# Patient Record
Sex: FEMALE | Race: WHITE | NOT HISPANIC OR LATINO | URBAN - METROPOLITAN AREA
[De-identification: names, ages, dates, MRNs, and addresses within clinical notes are randomized per-mention and may not be internally consistent; named-entity substitution may affect disease eponyms.]

---

## 2020-01-04 ENCOUNTER — EMERGENCY (EMERGENCY)
Facility: HOSPITAL | Age: 55
LOS: 0 days | Discharge: HOME | End: 2020-01-04
Attending: EMERGENCY MEDICINE
Payer: COMMERCIAL

## 2020-01-04 VITALS — WEIGHT: 119.93 LBS | HEIGHT: 62 IN

## 2020-01-04 VITALS
TEMPERATURE: 98 F | OXYGEN SATURATION: 100 % | SYSTOLIC BLOOD PRESSURE: 126 MMHG | HEART RATE: 93 BPM | RESPIRATION RATE: 25 BRPM | DIASTOLIC BLOOD PRESSURE: 91 MMHG

## 2020-01-04 DIAGNOSIS — M06.9 RHEUMATOID ARTHRITIS, UNSPECIFIED: ICD-10-CM

## 2020-01-04 DIAGNOSIS — E78.5 HYPERLIPIDEMIA, UNSPECIFIED: ICD-10-CM

## 2020-01-04 DIAGNOSIS — R07.9 CHEST PAIN, UNSPECIFIED: ICD-10-CM

## 2020-01-04 DIAGNOSIS — F41.9 ANXIETY DISORDER, UNSPECIFIED: ICD-10-CM

## 2020-01-04 DIAGNOSIS — F32.9 MAJOR DEPRESSIVE DISORDER, SINGLE EPISODE, UNSPECIFIED: ICD-10-CM

## 2020-01-04 LAB
ALBUMIN SERPL ELPH-MCNC: 4.2 G/DL — SIGNIFICANT CHANGE UP (ref 3.3–5)
ALP SERPL-CCNC: 95 U/L — SIGNIFICANT CHANGE UP (ref 40–120)
ALT FLD-CCNC: 44 U/L — SIGNIFICANT CHANGE UP (ref 12–78)
ANION GAP SERPL CALC-SCNC: 6 MMOL/L — SIGNIFICANT CHANGE UP (ref 5–17)
APAP SERPL-MCNC: <2 UG/ML — LOW (ref 10–30)
APPEARANCE UR: CLEAR — SIGNIFICANT CHANGE UP
AST SERPL-CCNC: 26 U/L — SIGNIFICANT CHANGE UP (ref 15–37)
BASOPHILS # BLD AUTO: 0.04 K/UL — SIGNIFICANT CHANGE UP (ref 0–0.2)
BASOPHILS NFR BLD AUTO: 0.6 % — SIGNIFICANT CHANGE UP (ref 0–2)
BILIRUB SERPL-MCNC: 0.4 MG/DL — SIGNIFICANT CHANGE UP (ref 0.2–1.2)
BILIRUB UR-MCNC: NEGATIVE — SIGNIFICANT CHANGE UP
BUN SERPL-MCNC: 15 MG/DL — SIGNIFICANT CHANGE UP (ref 7–23)
CALCIUM SERPL-MCNC: 9.1 MG/DL — SIGNIFICANT CHANGE UP (ref 8.5–10.1)
CHLORIDE SERPL-SCNC: 105 MMOL/L — SIGNIFICANT CHANGE UP (ref 96–108)
CO2 SERPL-SCNC: 28 MMOL/L — SIGNIFICANT CHANGE UP (ref 22–31)
COLOR SPEC: YELLOW — SIGNIFICANT CHANGE UP
CREAT SERPL-MCNC: 0.77 MG/DL — SIGNIFICANT CHANGE UP (ref 0.5–1.3)
DIFF PNL FLD: NEGATIVE — SIGNIFICANT CHANGE UP
EOSINOPHIL # BLD AUTO: 0.02 K/UL — SIGNIFICANT CHANGE UP (ref 0–0.5)
EOSINOPHIL NFR BLD AUTO: 0.3 % — SIGNIFICANT CHANGE UP (ref 0–6)
ETHANOL SERPL-MCNC: <10 MG/DL — SIGNIFICANT CHANGE UP (ref 0–10)
GLUCOSE SERPL-MCNC: 93 MG/DL — SIGNIFICANT CHANGE UP (ref 70–99)
GLUCOSE UR QL: NEGATIVE MG/DL — SIGNIFICANT CHANGE UP
HCT VFR BLD CALC: 41.4 % — SIGNIFICANT CHANGE UP (ref 34.5–45)
HGB BLD-MCNC: 13.9 G/DL — SIGNIFICANT CHANGE UP (ref 11.5–15.5)
IMM GRANULOCYTES NFR BLD AUTO: 0.3 % — SIGNIFICANT CHANGE UP (ref 0–1.5)
KETONES UR-MCNC: ABNORMAL
LEUKOCYTE ESTERASE UR-ACNC: NEGATIVE — SIGNIFICANT CHANGE UP
LYMPHOCYTES # BLD AUTO: 1.32 K/UL — SIGNIFICANT CHANGE UP (ref 1–3.3)
LYMPHOCYTES # BLD AUTO: 21 % — SIGNIFICANT CHANGE UP (ref 13–44)
MCHC RBC-ENTMCNC: 31.7 PG — SIGNIFICANT CHANGE UP (ref 27–34)
MCHC RBC-ENTMCNC: 33.6 GM/DL — SIGNIFICANT CHANGE UP (ref 32–36)
MCV RBC AUTO: 94.3 FL — SIGNIFICANT CHANGE UP (ref 80–100)
MONOCYTES # BLD AUTO: 0.52 K/UL — SIGNIFICANT CHANGE UP (ref 0–0.9)
MONOCYTES NFR BLD AUTO: 8.3 % — SIGNIFICANT CHANGE UP (ref 2–14)
NEUTROPHILS # BLD AUTO: 4.37 K/UL — SIGNIFICANT CHANGE UP (ref 1.8–7.4)
NEUTROPHILS NFR BLD AUTO: 69.5 % — SIGNIFICANT CHANGE UP (ref 43–77)
NITRITE UR-MCNC: NEGATIVE — SIGNIFICANT CHANGE UP
PCP SPEC-MCNC: SIGNIFICANT CHANGE UP
PH UR: 7 — SIGNIFICANT CHANGE UP (ref 5–8)
PLATELET # BLD AUTO: 343 K/UL — SIGNIFICANT CHANGE UP (ref 150–400)
POTASSIUM SERPL-MCNC: 3.7 MMOL/L — SIGNIFICANT CHANGE UP (ref 3.5–5.3)
POTASSIUM SERPL-SCNC: 3.7 MMOL/L — SIGNIFICANT CHANGE UP (ref 3.5–5.3)
PROT SERPL-MCNC: 7.8 GM/DL — SIGNIFICANT CHANGE UP (ref 6–8.3)
PROT UR-MCNC: NEGATIVE MG/DL — SIGNIFICANT CHANGE UP
RBC # BLD: 4.39 M/UL — SIGNIFICANT CHANGE UP (ref 3.8–5.2)
RBC # FLD: 11.9 % — SIGNIFICANT CHANGE UP (ref 10.3–14.5)
SALICYLATES SERPL-MCNC: <1.7 MG/DL — LOW (ref 2.8–20)
SODIUM SERPL-SCNC: 139 MMOL/L — SIGNIFICANT CHANGE UP (ref 135–145)
SP GR SPEC: 1.01 — SIGNIFICANT CHANGE UP (ref 1.01–1.02)
TROPONIN I SERPL-MCNC: <0.015 NG/ML — SIGNIFICANT CHANGE UP (ref 0.01–0.04)
TROPONIN I SERPL-MCNC: <0.015 NG/ML — SIGNIFICANT CHANGE UP (ref 0.01–0.04)
TSH SERPL-MCNC: 3.18 UU/ML — SIGNIFICANT CHANGE UP (ref 0.34–4.82)
UROBILINOGEN FLD QL: NEGATIVE MG/DL — SIGNIFICANT CHANGE UP
WBC # BLD: 6.29 K/UL — SIGNIFICANT CHANGE UP (ref 3.8–10.5)
WBC # FLD AUTO: 6.29 K/UL — SIGNIFICANT CHANGE UP (ref 3.8–10.5)

## 2020-01-04 PROCEDURE — 84443 ASSAY THYROID STIM HORMONE: CPT

## 2020-01-04 PROCEDURE — 81003 URINALYSIS AUTO W/O SCOPE: CPT

## 2020-01-04 PROCEDURE — 99284 EMERGENCY DEPT VISIT MOD MDM: CPT

## 2020-01-04 PROCEDURE — 84484 ASSAY OF TROPONIN QUANT: CPT

## 2020-01-04 PROCEDURE — 85025 COMPLETE CBC W/AUTO DIFF WBC: CPT

## 2020-01-04 PROCEDURE — 93010 ELECTROCARDIOGRAM REPORT: CPT

## 2020-01-04 PROCEDURE — 93005 ELECTROCARDIOGRAM TRACING: CPT

## 2020-01-04 PROCEDURE — 80307 DRUG TEST PRSMV CHEM ANLYZR: CPT

## 2020-01-04 PROCEDURE — 87086 URINE CULTURE/COLONY COUNT: CPT

## 2020-01-04 PROCEDURE — 99284 EMERGENCY DEPT VISIT MOD MDM: CPT | Mod: 25

## 2020-01-04 PROCEDURE — 80053 COMPREHEN METABOLIC PANEL: CPT

## 2020-01-04 PROCEDURE — 71045 X-RAY EXAM CHEST 1 VIEW: CPT

## 2020-01-04 PROCEDURE — 36415 COLL VENOUS BLD VENIPUNCTURE: CPT

## 2020-01-04 PROCEDURE — 71045 X-RAY EXAM CHEST 1 VIEW: CPT | Mod: 26

## 2020-01-04 PROCEDURE — 90792 PSYCH DIAG EVAL W/MED SRVCS: CPT | Mod: GT,GC

## 2020-01-04 RX ADMIN — Medication 1 MILLIGRAM(S): at 18:55

## 2020-01-04 NOTE — ED PROVIDER NOTE - PHYSICAL EXAMINATION
Constitutional: No fever or chills  Eyes: No visual changes  HEENT: No throat pain  CV: No chest pain  Resp: No SOB no cough  GI: No abd pain, nausea or vomiting  : No dysuria  MSK: No musculoskeletal pain  Skin: No rash  Neuro: No headache   Psych: +tearful, +anxious +stressed

## 2020-01-04 NOTE — ED BEHAVIORAL HEALTH NOTE - BEHAVIORAL HEALTH NOTE
Consult requested by EM Attending Dr. Coley at 20:06 Telepsychiatry attempted to consult at 20:20 but  unable to initiate due to no 1:1 being available. ED staff educated to notify Telepsychiatry  once 1:1 available.

## 2020-01-04 NOTE — ED PROVIDER NOTE - OBJECTIVE STATEMENT
53 y/o female with PMHx of anxiety, hx of suicide attempt at 19 y/o presents to the ED for psych evaluation. Pt states she is having a hard time with her son who has autism and is  "combative" with her. Pt reports she got her son a medical marijuana card today after he has not been taking his medications and when she told him to use it per the doctor's request, her son grew very angry with her. Patient attempted to jump out of the car. They both went home, and her son started to punch holes in the wall and she yelled at him. and told him "she hates him" and wants him to leave. Patient denies physical harm from son.. Denies auditory/visual hallucinations, SI, HI. Pt has been emotionally stressed, is shaking, very tearful, and questions why she is even here. Also notes that she is having CP. Pt has been very stressed trying to take care of her son and her elderly parent. On Lexapro 30mg after suicide attempt at 17y/o. Non smoker, social EtOH use. 53 y/o female with PMHx of anxiety, hx of suicide attempt at 19 y/o presents to the ED for psych evaluation. Pt states she is having a hard time with her son who has autism and is  "combative" with her. Pt reports she got her son a medical marijuana card today after he has not been taking his medications and when she told him to use it per the doctor's request, her son grew very angry with her. Patient attempted to jump out of the car. They both went home, and her son started to punch holes in the wall and she yelled at him. and told him "she hates him" and wants him to leave. Patient denies physical harm from son.. Denies auditory/visual hallucinations, SI, HI. Pt has been emotionally stressed, is shaking, very tearful, and questions why she is even here. Also notes that she is having CP. Pt has been very stressed trying to take care of her son and her elderly parent. Non smoker, social EtOH use.

## 2020-01-04 NOTE — ED PROVIDER NOTE - PATIENT PORTAL LINK FT
You can access the FollowMyHealth Patient Portal offered by [facility] by registering at the following website: [email or web address]. By joining [facility]’s FollowMyHealth portal, you will also be able to view your health information using other applications (apps) compatible with our system.

## 2020-01-04 NOTE — ED PROVIDER NOTE - NS_ ATTENDINGSCRIBEDETAILS _ED_A_ED_FT
I, Gian Cano MD,  performed the initial face to face bedside interview with this patient regarding history of present illness, review of symptoms and relevant past medical, social and family history.  I completed an independent physical examination.  I was the initial provider who evaluated this patient.  The history, relevant review of systems, past medical and surgical history, medical decision making, and physical examination was documented by the scribe in my presence and I attest to the accuracy of the documentation.

## 2020-01-04 NOTE — ED BEHAVIORAL HEALTH ASSESSMENT NOTE - SUMMARY
54 year-old woman with depression and anxiety, one psychiatric hospitalization at age 18 when she attempted to OD on pills, no other suicide attempts, no SIB, denying substance abuse, domiciled, employed, in treatment at [facility], who walked in to the ED today with a complaint having a nervous breakdown. The patient's presentation tonight is explained by poor coping skills and a low frustration tolerance in dealing with the psychosocial stressors of living at home with a high-functioning autistic son. The patient denies SI/HI and, per collateral, she is at her psychiatric baseline. The patient is able to contract for safety and she is agreeable to following up with her outpatient therapist and psychiatrist at [facility]. 54 year-old woman with depression and anxiety, one psychiatric hospitalization at age 18 when she attempted to OD on pills, no other suicide attempts, no SIB, denying substance abuse, domiciled, employed, in treatment at [facility], who walked in to the ED today with a complaint having a nervous breakdown. On interview, the patient reports that her "nervous breakdown" has resolved. The patient's presentation tonight is explained by poor coping skills and a low frustration tolerance in dealing with the psychosocial stressors of living at home with a high-functioning autistic son who can be belligerent at times. The patient denies SI/HI and, per collateral, she is at her psychiatric baseline. The patient is able to contract for safety and she is agreeable to follow up with her outpatient therapist and psychiatrist at [facility]. She does not want inpatient treatment; nor is inpatient treatment indicated given her presentation tonight.

## 2020-01-04 NOTE — ED BEHAVIORAL HEALTH ASSESSMENT NOTE - DETAILS
History of overdosing at age 18 Son with autism, daughter with anxiety patient walked in to the ED by herself

## 2020-01-04 NOTE — ED PROVIDER NOTE - CLINICAL SUMMARY MEDICAL DECISION MAKING FREE TEXT BOX
54 F presents to the ED with hx of anxiety with significant life stressors at home caused her to "break down" Started to have shaking, CP and thoughts of why she is even here. Concern for psychiatric well-being. Will obtain psych labs, pysch consult and reasess. 54 F presents to the ED with hx of anxiety with significant life stressors at home caused her to "break down" Started to have shaking, CP and thoughts of why she is even here. Concern for psychiatric well-being. Will obtain psych labs, psychiatric consult and reassess.

## 2020-01-04 NOTE — ED PROVIDER NOTE - NS ED ROS FT
Constitutional: No fever or chills  Eyes: No visual changes  HEENT: No throat pain  CV: No chest pain  Resp: No SOB no cough  GI: No abd pain, nausea or vomiting  : No dysuria  MSK: No musculoskeletal pain  Skin: No rash  Neuro: No headache   Psych: +anxious, +stressed Constitutional: No fever or chills  Eyes: No visual changes  HEENT: No throat pain  CV: No chest pain  Resp: No SOB no cough  GI: No abd pain, nausea or vomiting  : No dysuria  MSK: No musculoskeletal pain  Skin: No rash  Neuro: No headache   Psych: +anxious, +stressed +shaky

## 2020-01-04 NOTE — ED PROVIDER NOTE - NSFOLLOWUPINSTRUCTIONS_ED_ALL_ED_FT
1. return for worsening symptoms or anything concerning to you  2. take all home meds as prescribed  3. follow up with your pmd call to make an appointment   4. follow up at [facility]    Anxiety    WHAT YOU NEED TO KNOW:    Anxiety is a condition that causes you to feel extremely worried or nervous. The feelings are so strong that they can cause problems with your daily activities or sleep. Anxiety may be triggered by something you fear, or it may happen without a cause. Family or work stress, smoking, caffeine, and alcohol can increase your risk for anxiety. Certain medicines or health conditions can also increase your risk. Anxiety can become a long-term condition if it is not managed or treated.     DISCHARGE INSTRUCTIONS:    Call 911 if:     You have chest pain, tightness, or heaviness that may spread to your shoulders, arms, jaw, neck, or back.      You feel like hurting yourself or someone else.     Contact your healthcare provider if:     Your symptoms get worse or do not get better with treatment.      Your anxiety keeps you from doing your regular daily activities.      You have new symptoms since your last visit.      You have questions or concerns about your condition or care.    Medicines:     Medicines may be given to help you feel more calm and relaxed, and decrease your symptoms.      Take your medicine as directed. Contact your healthcare provider if you think your medicine is not helping or if you have side effects. Tell him of her if you are allergic to any medicine. Keep a list of the medicines, vitamins, and herbs you take. Include the amounts, and when and why you take them. Bring the list or the pill bottles to follow-up visits. Carry your medicine list with you in case of an emergency.    Follow up with your healthcare provider within 2 weeks or as directed: Write down your questions so you remember to ask them during your visits.    Manage anxiety:     Talk to someone about your anxiety. Your healthcare provider may suggest counseling. Cognitive behavioral therapy can help you understand and change how you react to events that trigger your symptoms. You might feel more comfortable talking with a friend or family member about your anxiety. Choose someone you know will be supportive and encouraging.      Find ways to relax. Activities such as exercise, meditation, or listening to music can help you relax. Spend time with friends, or do things you enjoy.      Practice deep breathing. Deep breathing can help you relax when you feel anxious. Focus on taking slow, deep breaths several times a day, or during an anxiety attack. Breathe in through your nose and out through your mouth.       Create a regular sleep routine. Regular sleep can help you feel calmer during the day. Go to sleep and wake up at the same times every day. Do not watch television or use the computer right before bed. Your room should be comfortable, dark, and quiet.       Eat a variety of healthy foods. Healthy foods include fruits, vegetables, low-fat dairy products, lean meats, fish, whole-grain breads, and cooked beans. Healthy foods can help you feel less anxious and have more energy.      Exercise regularly. Exercise can increase your energy level. Exercise may also lift your mood and help you sleep better. Your healthcare provider can help you create an exercise plan.      Do not smoke. Nicotine and other chemicals in cigarettes and cigars can increase anxiety. Ask your healthcare provider for information if you currently smoke and need help to quit. E-cigarettes or smokeless tobacco still contain nicotine. Talk to your healthcare provider before you use these products.       Do not have caffeine. Caffeine can make your symptoms worse. Do not have foods or drinks that are meant to increase your energy level.      Limit or do not drink alcohol. Ask your healthcare provider if alcohol is safe for you. You may not be able to drink alcohol if you take certain anxiety or depression medicines. Limit alcohol to 1 drink per day if you are a woman. Limit alcohol to 2 drinks per day if you are a man. A drink of alcohol is 12 ounces of beer, 5 ounces of wine, or 1½ ounces of liquor.       Do not use drugs. Drugs can make your anxiety worse. It can also make anxiety hard to manage. Talk to your healthcare provider if you use drugs and want help to quit.

## 2020-01-04 NOTE — ED BEHAVIORAL HEALTH ASSESSMENT NOTE - RISK ASSESSMENT
Chronic risk is elevated because of a history of a suicide attempt and FHx of a suicide attempt (son). The patient's protective factors include no SI/HI presently or recently, being future-oriented, being employed, being domiciled, denying substance abuse, being engaged in treatment, reporting medication compliance. The patient is safe for outpatient follow up. Low Acute Suicide Risk Chronic risk is elevated because of a history of a suicide attempt and FHx of a suicide attempt (son). These risks were addressed tonight through supportive counseling and safety planning. The patient's protective factors include no SI/HI presently or recently, being future-oriented, being employed, being domiciled, denying substance abuse, being engaged in treatment, reporting medication compliance. The patient is safe for outpatient follow up.

## 2020-01-04 NOTE — ED PROVIDER NOTE - PROGRESS NOTE DETAILS
spoke with telepsych will see patient shortly. Gian Cano M.D., Attending Physician trop negative x 2 - cleared by telepsych to follow up with [facility]. will d/c with follow up and strict return precautions. Gian Cano M.D., Attending Physician

## 2020-01-04 NOTE — ED BEHAVIORAL HEALTH ASSESSMENT NOTE - DESCRIPTION
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 Per nurse Fabi, patient arrived to the ED at 16:53 dressed appropriately for weather, with good hygiene/grooming and unaccompanied by family or social supports. Nurse notes that upon arrival patient stated that today she had an argument with her son (who has Asperger’s and is combative) states that she has been emotionally stressed and had a panic attack. Nurse notes that upon arrival patient was anxious, shaky and given Ativan 1mg ~17:51 with good effect. Patient denies SI/HI and has been calm since receiving the Ativan. Patient complied with triage protocols – provided blood/urine willingly, changed into a hospital gown and allowed security to wand/collect her belongings without incident. Nothing of note in patients’ belongings. Nurse notes that patients speech is at a normal rate, audible, good articulation, good eye contact and a linear thought content. Patient denies AVH, delusions, is AXO3 and presents with good insight and judgement. Patient has not eaten as of yet and is resting calmly on the stretcher. No behavioral issues or aggression reported. Patient placed on an arms-length 1:1, in a hospital gown and in a private room for telepsych assessment. No additional complaints at this time.     ICU Vital Signs Last 24 Hrs  T(C): 36.8 (04 Jan 2020 17:31), Max: 36.8 (04 Jan 2020 17:31)  T(F): 98.3 (04 Jan 2020 17:31), Max: 98.3 (04 Jan 2020 17:31)  HR: 93 (04 Jan 2020 17:31) (93 - 93)  BP: 126/91 (04 Jan 2020 17:31) (126/91 - 126/91)  BP(mean): --  ABP: --  ABP(mean): --  RR: 25 (04 Jan 2020 17:31) (25 - 25)  SpO2: 100% (04 Jan 2020 17:31) (100% - 100%)
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
## 2020-01-04 NOTE — ED BEHAVIORAL HEALTH ASSESSMENT NOTE - REFERRAL / APPOINTMENT DETAILS
Follow up at [facility] weekly group next week and with  "ISSA" at next medication management appointment

## 2020-01-04 NOTE — ED ADULT NURSE NOTE - OBJECTIVE STATEMENT
Pt ambulatory to triage, A&O x3, requesting psych evaluation.  Pt Pt ambulatory to triage, A&O x3, requesting psych evaluation.  Pt reports having argument with son who has Asperger's today, states "he got mad at me in the car and jumped out, I don't know where he is and I'm afraid he's going to kill himself and it's my fault."  Pt denies SI/HI.  Pt c/o anxiety, chest pain, "I can't stop shaking." Hx of arthritis.  Pt is calm and appropriate.

## 2020-01-04 NOTE — ED STATDOCS - PROGRESS NOTE DETAILS
y/o female with PMHx of anxiety presents to the ED for psych evaluation. Pt reports she got her son a medical marijuana card today and when she told him to use it per the doctor's request, her son grew very angry with her, she told him she hated him, and pt tried to get out of the car. Denies physical harm from son. "Now my son is going to try to kill himself." "I can't stop shaking." Denies auditory/visual hallucinations, SI, HI. When asked if she has SI, pt states "I'm too afraid to kill myself." Pt states she is having a hard time with her son who has autism and is "mean" and "combative" with her. Pt has been emotionally stressed, is shaking and very tearful. Pt has been very stressed trying to take care of her son and her elderly parent. Pt reports not wanting any family to visit her at this time. On Lexapro 30mg after suicide attempt at 19y/o. Will send pt to main ED for further evaluation. Jasmin POP for ED attending, Dr. Agarwal: 55 y/o female with PMHx of anxiety, hx of suicide attempt at 17 y/o presents to the ED for psych evaluation. Pt reports she got her son a medical marijuana card today and when she told him to use it per the doctor's request, her son grew very angry with her, she told him she hated him, and pt tried to get out of the car. Denies physical harm from son. "Now my son is going to try to kill himself." "I can't stop shaking." Denies auditory/visual hallucinations, SI, HI. When asked if she has SI, pt states "I'm too afraid to kill myself." Pt states she is having a hard time with her son who has autism and is "mean" and "combative" with her. Pt has been emotionally stressed, is shaking and very tearful. Pt has been very stressed trying to take care of her son and her elderly parent. Pt reports not wanting any family to visit her at this time. On Lexapro 30mg after suicide attempt at 17y/o. Will send pt to main ED for further evaluation. Jasmin POP for ED attending, Dr. Agarwal: 55 y/o female with PMHx of anxiety, hx of suicide attempt at 17 y/o presents to the ED for psych evaluation. Pt states she is having a hard time with her son who has autism and is "mean" and "combative" with her. Pt reports she got her son a medical marijuana card today and when she told him to use it per the doctor's request, her son grew very angry with her, she told him she hated him, and pt's son tried to get out of the car. Denies physical harm from son. "Now my son is going to try to kill himself." "I can't stop shaking." Denies auditory/visual hallucinations, SI, HI. When asked if she has SI, pt states "I'm too afraid to kill myself." Pt has been emotionally stressed, is shaking and very tearful. Pt has been very stressed trying to take care of her son and her elderly parent. Pt reports not wanting any family to visit her at this time. On Lexapro 30mg after suicide attempt at 19y/o. Will send pt to main ED for further evaluation.

## 2020-01-04 NOTE — ED BEHAVIORAL HEALTH ASSESSMENT NOTE - SUICIDE PROTECTIVE FACTORS
Positive therapeutic relationships/Engaged in work or school/Identifies reasons for living/Has future plans/Supportive social network of family or friends/Responsibility to family and others

## 2020-01-04 NOTE — ED BEHAVIORAL HEALTH ASSESSMENT NOTE - CASE SUMMARY
In brief, the patient is a 54-year-old woman with a reported history of anxiety and depression, 1 prior remote suicide attempt prompting her lone inpatient psychiatric hospitalization, currently in outpatient mental health treatment through the [facility], who presented with anxiety in the context of an argument with her autistic son. The patient reports that her anxiety has decreased during her time spent in the hospital and persistently denied any acute suicidality or homicidality throughout the course of her stay. The patient is not an imminent threat to self or others at present and does not require acute inpatient psychiatric hospitalization at this point in time. She remains appropriate for an outpatient level of mental health care and will follow-up with her outpatient mental health provider.

## 2020-01-04 NOTE — ED BEHAVIORAL HEALTH ASSESSMENT NOTE - HPI (INCLUDE ILLNESS QUALITY, SEVERITY, DURATION, TIMING, CONTEXT, MODIFYING FACTORS, ASSOCIATED SIGNS AND SYMPTOMS)
54 year-old woman with depression and anxiety, one psychiatric hospitalization at age 18 when she attempted to OD on pills, no other suicide attempts, no SIB, denying substance abuse, domiciled, employed, in treatment at [facility], who walked in to the ED today with a complaint having a nervous breakdown. In the ED, the patient was noted to be anxious and she was given 1 mg of Ativan at 1800. On evaluation, the patient reports that her "nervous breakdown" has resolved but she continues to have regrets about an argument at home today with her son who has high-functioning austism. She says the argument was about a medical marijuana card that the son's doctor gave him and the patient told the son to use the marijuana as prescribed, which caused the son to become irate and punch walls. The patient then told her son she hated him and needed him to leave the house. She states that she worries about her son because he attempted to hang himself last year. She denies that she thinks her son is going to harm himself imminently. The patient herself denies SI/HI and does not want to be admitted to the hospital. The patient denies symptoms of psychosis or silvestre, past or present. 54 year-old woman with depression and anxiety, one psychiatric hospitalization at age 18 when she attempted to OD on pills, no other suicide attempts, no SIB, denying substance abuse, domiciled, employed, in treatment at [facility], who walked in to the ED today with a complaint having a nervous breakdown. In the ED, the patient was noted to be anxious and she was given 1 mg of Ativan at 1800. On evaluation, the patient reports that her "nervous breakdown" has resolved but she continues to have regrets about an argument at home today with her son who has high-functioning austism. She says the argument was about a medical marijuana card that the son's doctor gave him and the patient told the son to use the marijuana as prescribed, which caused the son to become irate and punch walls. The patient then told her son she hated him and needed him to leave the house. She states that she worries about her son because he attempted to hang himself last year. She denies that she thinks her son is going to harm himself imminently. The patient herself denies SI/HI and does not want to be admitted to the hospital. The patient denies symptoms of psychosis or silvestre, past or present.    The patient's  reports that the patient is at her baseline. He says that she and the son are frequently having disagreements that sometimes grow heated with the son lashing out physically. The  has no acute safety concerns for the patient or for his son. 54 year-old woman with depression and anxiety, one psychiatric hospitalization at age 18 when she attempted to OD on pills, no other suicide attempts, no SIB, denying substance abuse, domiciled, employed, in treatment at [facility], who walked in to the ED today with a complaint having a nervous breakdown. In the ED, the patient was noted to be anxious and she was given 1 mg of Ativan at 1800. On evaluation, the patient reports that her "nervous breakdown" has resolved but she continues to have regrets about an argument at home today with her son who has high-functioning austism. She says the argument was about a medical marijuana card that the son's doctor gave him and the patient told the son to use the marijuana as prescribed, which caused the son to become irate and punch walls. The patient then told her son she hated him and needed him to leave the house. She states that she worries about her son because he attempted to hang himself last year. She denies that she thinks her son is going to harm himself imminently. The patient herself denies SI/HI and does not want to be admitted to the hospital. The patient denies symptoms of psychosis or silvestre, past or present.    Collateral provided by , Solitario Stokesadriana [phone] daily contact and reliability is high but limited. Patient's baseline symptoms include a fluctuation of high-low moods, mild temperament and mild irritability. Baseline treatment includes monthly psychopharm, is prescribed Lexapro (psychiatrist name and medication dosage unknown), weekly psychotherapy (therapist name unknown) and weekly group therapy.  wonders whether patient may have been inconsistently taking her medications around the holidays. He states that at baseline patient is mildly irritable, but typically has patience for her son. He reports that patient has been under a significant amount of stress with her son – he has been more defiant, agitated and seemingly unappreciative. He states that their son is scheduled to return back to school at the end of January, which is a stress provoking situation for patient, as last semester son had difficulties adjusting to college and had to return home. He states that today, patient and her son had an argument about his medication compliance, which quickly escalated to an explosive episode – patient made hurtful comments towards son and in return he punched holes in the wall.  notes that son has never physically assaulted him or patient. No other mood/psychotic/depressive/anxiety symptoms, aggression or thoughts of harming self or others reported.

## 2020-01-04 NOTE — ED ADULT NURSE NOTE - NSIMPLEMENTINTERV_GEN_ALL_ED
Implemented All Universal Safety Interventions:  [place]rif[place] to call system. Call bell, personal items and telephone within reach. Instruct patient to call for assistance. Room bathroom lighting operational. Non-slip footwear when patient is off stretcher. Physically safe environment: no spills, clutter or unnecessary equipment. Stretcher in lowest position, wheels locked, appropriate side rails in place.

## 2020-01-05 LAB
CULTURE RESULTS: SIGNIFICANT CHANGE UP
SPECIMEN SOURCE: SIGNIFICANT CHANGE UP

## 2020-02-28 ENCOUNTER — EMERGENCY (EMERGENCY)
Facility: HOSPITAL | Age: 55
LOS: 0 days | Discharge: HOME | End: 2020-02-29
Attending: EMERGENCY MEDICINE
Payer: COMMERCIAL

## 2020-02-28 VITALS — WEIGHT: 145.06 LBS | HEIGHT: 66 IN

## 2020-02-28 DIAGNOSIS — F41.9 ANXIETY DISORDER, UNSPECIFIED: ICD-10-CM

## 2020-02-28 DIAGNOSIS — R11.2 NAUSEA WITH VOMITING, UNSPECIFIED: ICD-10-CM

## 2020-02-28 DIAGNOSIS — R10.9 UNSPECIFIED ABDOMINAL PAIN: ICD-10-CM

## 2020-02-28 DIAGNOSIS — R19.7 DIARRHEA, UNSPECIFIED: ICD-10-CM

## 2020-02-28 PROCEDURE — 99284 EMERGENCY DEPT VISIT MOD MDM: CPT | Mod: 25

## 2020-02-28 PROCEDURE — 80053 COMPREHEN METABOLIC PANEL: CPT

## 2020-02-28 PROCEDURE — 99284 EMERGENCY DEPT VISIT MOD MDM: CPT

## 2020-02-28 PROCEDURE — 85025 COMPLETE CBC W/AUTO DIFF WBC: CPT

## 2020-02-28 PROCEDURE — 83690 ASSAY OF LIPASE: CPT

## 2020-02-28 PROCEDURE — 96375 TX/PRO/DX INJ NEW DRUG ADDON: CPT

## 2020-02-28 PROCEDURE — 36415 COLL VENOUS BLD VENIPUNCTURE: CPT

## 2020-02-28 PROCEDURE — 81001 URINALYSIS AUTO W/SCOPE: CPT

## 2020-02-28 PROCEDURE — 96374 THER/PROPH/DIAG INJ IV PUSH: CPT

## 2020-02-29 VITALS
HEART RATE: 93 BPM | RESPIRATION RATE: 16 BRPM | OXYGEN SATURATION: 96 % | DIASTOLIC BLOOD PRESSURE: 80 MMHG | TEMPERATURE: 98 F | SYSTOLIC BLOOD PRESSURE: 130 MMHG

## 2020-02-29 PROBLEM — F41.9 ANXIETY DISORDER, UNSPECIFIED: Chronic | Status: ACTIVE | Noted: 2020-01-04

## 2020-02-29 LAB
ALBUMIN SERPL ELPH-MCNC: 4.3 G/DL — SIGNIFICANT CHANGE UP (ref 3.3–5)
ALP SERPL-CCNC: 101 U/L — SIGNIFICANT CHANGE UP (ref 40–120)
ALT FLD-CCNC: 51 U/L — SIGNIFICANT CHANGE UP (ref 12–78)
ANION GAP SERPL CALC-SCNC: 6 MMOL/L — SIGNIFICANT CHANGE UP (ref 5–17)
APPEARANCE UR: CLEAR — SIGNIFICANT CHANGE UP
AST SERPL-CCNC: 37 U/L — SIGNIFICANT CHANGE UP (ref 15–37)
BASOPHILS # BLD AUTO: 0.02 K/UL — SIGNIFICANT CHANGE UP (ref 0–0.2)
BASOPHILS NFR BLD AUTO: 0.2 % — SIGNIFICANT CHANGE UP (ref 0–2)
BILIRUB SERPL-MCNC: 0.6 MG/DL — SIGNIFICANT CHANGE UP (ref 0.2–1.2)
BILIRUB UR-MCNC: NEGATIVE — SIGNIFICANT CHANGE UP
BUN SERPL-MCNC: 21 MG/DL — SIGNIFICANT CHANGE UP (ref 7–23)
CALCIUM SERPL-MCNC: 9.4 MG/DL — SIGNIFICANT CHANGE UP (ref 8.5–10.1)
CHLORIDE SERPL-SCNC: 104 MMOL/L — SIGNIFICANT CHANGE UP (ref 96–108)
CO2 SERPL-SCNC: 28 MMOL/L — SIGNIFICANT CHANGE UP (ref 22–31)
COLOR SPEC: YELLOW — SIGNIFICANT CHANGE UP
CREAT SERPL-MCNC: 0.88 MG/DL — SIGNIFICANT CHANGE UP (ref 0.5–1.3)
DIFF PNL FLD: NEGATIVE — SIGNIFICANT CHANGE UP
EOSINOPHIL # BLD AUTO: 0.02 K/UL — SIGNIFICANT CHANGE UP (ref 0–0.5)
EOSINOPHIL NFR BLD AUTO: 0.2 % — SIGNIFICANT CHANGE UP (ref 0–6)
GLUCOSE SERPL-MCNC: 112 MG/DL — HIGH (ref 70–99)
GLUCOSE UR QL: NEGATIVE MG/DL — SIGNIFICANT CHANGE UP
HCT VFR BLD CALC: 46.7 % — HIGH (ref 34.5–45)
HGB BLD-MCNC: 16.4 G/DL — HIGH (ref 11.5–15.5)
IMM GRANULOCYTES NFR BLD AUTO: 0.4 % — SIGNIFICANT CHANGE UP (ref 0–1.5)
KETONES UR-MCNC: ABNORMAL
LEUKOCYTE ESTERASE UR-ACNC: NEGATIVE — SIGNIFICANT CHANGE UP
LIDOCAIN IGE QN: 145 U/L — SIGNIFICANT CHANGE UP (ref 73–393)
LYMPHOCYTES # BLD AUTO: 0.19 K/UL — LOW (ref 1–3.3)
LYMPHOCYTES # BLD AUTO: 1.7 % — LOW (ref 13–44)
MCHC RBC-ENTMCNC: 32.9 PG — SIGNIFICANT CHANGE UP (ref 27–34)
MCHC RBC-ENTMCNC: 35.1 GM/DL — SIGNIFICANT CHANGE UP (ref 32–36)
MCV RBC AUTO: 93.6 FL — SIGNIFICANT CHANGE UP (ref 80–100)
MONOCYTES # BLD AUTO: 0.4 K/UL — SIGNIFICANT CHANGE UP (ref 0–0.9)
MONOCYTES NFR BLD AUTO: 3.7 % — SIGNIFICANT CHANGE UP (ref 2–14)
NEUTROPHILS # BLD AUTO: 10.21 K/UL — HIGH (ref 1.8–7.4)
NEUTROPHILS NFR BLD AUTO: 93.8 % — HIGH (ref 43–77)
NITRITE UR-MCNC: NEGATIVE — SIGNIFICANT CHANGE UP
PH UR: 5 — SIGNIFICANT CHANGE UP (ref 5–8)
PLATELET # BLD AUTO: 327 K/UL — SIGNIFICANT CHANGE UP (ref 150–400)
POTASSIUM SERPL-MCNC: 4.1 MMOL/L — SIGNIFICANT CHANGE UP (ref 3.5–5.3)
POTASSIUM SERPL-SCNC: 4.1 MMOL/L — SIGNIFICANT CHANGE UP (ref 3.5–5.3)
PROT SERPL-MCNC: 8.4 GM/DL — HIGH (ref 6–8.3)
PROT UR-MCNC: 15 MG/DL
RBC # BLD: 4.99 M/UL — SIGNIFICANT CHANGE UP (ref 3.8–5.2)
RBC # FLD: 12.3 % — SIGNIFICANT CHANGE UP (ref 10.3–14.5)
SODIUM SERPL-SCNC: 138 MMOL/L — SIGNIFICANT CHANGE UP (ref 135–145)
SP GR SPEC: 1.02 — SIGNIFICANT CHANGE UP (ref 1.01–1.02)
UROBILINOGEN FLD QL: NEGATIVE MG/DL — SIGNIFICANT CHANGE UP
WBC # BLD: 10.88 K/UL — HIGH (ref 3.8–10.5)
WBC # FLD AUTO: 10.88 K/UL — HIGH (ref 3.8–10.5)

## 2020-02-29 RX ORDER — SODIUM CHLORIDE 9 MG/ML
1000 INJECTION INTRAMUSCULAR; INTRAVENOUS; SUBCUTANEOUS ONCE
Refills: 0 | Status: COMPLETED | OUTPATIENT
Start: 2020-02-29 | End: 2020-02-29

## 2020-02-29 RX ORDER — LIDOCAINE 4 G/100G
10 CREAM TOPICAL ONCE
Refills: 0 | Status: COMPLETED | OUTPATIENT
Start: 2020-02-29 | End: 2020-02-29

## 2020-02-29 RX ORDER — FAMOTIDINE 10 MG/ML
1 INJECTION INTRAVENOUS
Qty: 20 | Refills: 0
Start: 2020-02-29 | End: 2020-03-09

## 2020-02-29 RX ORDER — ONDANSETRON 8 MG/1
4 TABLET, FILM COATED ORAL ONCE
Refills: 0 | Status: COMPLETED | OUTPATIENT
Start: 2020-02-29 | End: 2020-02-29

## 2020-02-29 RX ORDER — KETOROLAC TROMETHAMINE 30 MG/ML
30 SYRINGE (ML) INJECTION ONCE
Refills: 0 | Status: DISCONTINUED | OUTPATIENT
Start: 2020-02-29 | End: 2020-02-29

## 2020-02-29 RX ORDER — FAMOTIDINE 10 MG/ML
20 INJECTION INTRAVENOUS ONCE
Refills: 0 | Status: COMPLETED | OUTPATIENT
Start: 2020-02-29 | End: 2020-02-29

## 2020-02-29 RX ADMIN — SODIUM CHLORIDE 1000 MILLILITER(S): 9 INJECTION INTRAMUSCULAR; INTRAVENOUS; SUBCUTANEOUS at 02:08

## 2020-02-29 RX ADMIN — Medication 30 MILLILITER(S): at 01:42

## 2020-02-29 RX ADMIN — FAMOTIDINE 20 MILLIGRAM(S): 10 INJECTION INTRAVENOUS at 01:43

## 2020-02-29 RX ADMIN — Medication 30 MILLIGRAM(S): at 01:43

## 2020-02-29 RX ADMIN — ONDANSETRON 4 MILLIGRAM(S): 8 TABLET, FILM COATED ORAL at 01:33

## 2020-02-29 RX ADMIN — SODIUM CHLORIDE 1000 MILLILITER(S): 9 INJECTION INTRAMUSCULAR; INTRAVENOUS; SUBCUTANEOUS at 01:37

## 2020-02-29 RX ADMIN — Medication 30 MILLIGRAM(S): at 02:08

## 2020-02-29 NOTE — ED PROVIDER NOTE - PATIENT PORTAL LINK FT
You can access the FollowMyHealth Patient Portal offered by Health system by registering at the following website: [email or web address]. By joining [employer] [facility]’s FollowMyHealth portal, you will also be able to view your health information using other applications (apps) compatible with our system.

## 2020-02-29 NOTE — ED PROVIDER NOTE - NSFOLLOWUPINSTRUCTIONS_ED_ALL_ED_FT
please follow up with your doctor in 2-3 days.   take medication as prescribed.    drink plenty of fluids.   return to ED for any concerns

## 2020-02-29 NOTE — ED ADULT NURSE NOTE - OBJECTIVE STATEMENT
Patient complaining of multiple episodes of vomiting since 1pm today after eating sushi with shrimp.  She felt that the shrimp tasted funny as soon as she ate it.  Abdominal pain related to the vomiting.  +diarrhea.

## 2020-02-29 NOTE — ED PROVIDER NOTE - OBJECTIVE STATEMENT
53 y/o female in ED c/o n/v/d/abd pain today after eating at a [place] restaurant.   pt states did not eat anything raw but states food tasted funny.   pt denies any fever, HA, cp, sob.   states unable to tolerate any PO.   no sick contacts or recent travel.

## 2020-02-29 NOTE — ED PROVIDER NOTE - PROGRESS NOTE DETAILS
pt and spouse told of results.   states feels better and tolerating PO in ED.   will d/c home with script and f/u

## 2020-02-29 NOTE — ED ADULT NURSE NOTE - NSIMPLEMENTINTERV_GEN_ALL_ED
Implemented All Universal Safety Interventions:  [place] to call system. Call bell, personal items and telephone within reach. Instruct patient to call for assistance. Room bathroom lighting operational. Non-slip footwear when patient is off stretcher. Physically safe environment: no spills, clutter or unnecessary equipment. Stretcher in lowest position, wheels locked, appropriate side rails in place.

## 2020-04-04 NOTE — ED ADULT NURSE NOTE - NS ED NURSE LEVEL OF CONSCIOUSNESS MENTAL STATUS
Nursing notes reviewed as above.       CHIEF COMPLAINT        Chief Complaint   Patient presents with   • Stroke Alert         HPI    Patient states that she woke up early this morning and she was fine felt good notes that she has not been sick recently denies any headache denies any cough or fevers.  Denies any vomiting or diarrhea.  She says that at 620 she was going to drink her coffee that she had made and she had difficulty picking up the coffee cup and when she slipped on the coffee it just ran out of her mouth.  It is then that she noticed that she had some drooping to her face and weakness and uncoordination in her left hand.    PMH / PSH    Hypertension, history of TIA        Essential (primary) hypertension                                 There is no previous surgical history on file.         MEDICATIONS - see med req for complete med list.    Hydrochlorothiazide, aspirin, potassium supplement    Current Facility-Administered Medications   Medication Dose Route Frequency Provider Last Rate Last Dose   • alteplase (ACTIVASE) in sterile water for ischemic stroke initial bolus 3.8 mg  0.09 mg/kg Intravenous Once Zi Blanchard MD        Followed by   • alteplase (ACTIVASE) in sterile water for ischemic stroke IVPB 34.2 mg  34.2 mg Intravenous Once Zi Blanchard MD        Followed by   • sodium chloride 0.9 % flush bag 100 mL  100 mL Intravenous Once Zi Blanchard MD         Current Outpatient Medications   Medication Sig Dispense Refill   • potassium CHLORIDE (KLOR-CON M) 20 MEQ lara ER tablet Take 1 tablet by mouth daily. 90 tablet 3   • hydrochlorothiazide (MICROZIDE) 12.5 MG capsule Take 1 capsule by mouth daily. 90 capsule 1   • aspirin 81 MG tablet Take 1 tablet by mouth daily.             ALLERGIES - see med req for complete allergies.    NKDA    ALLERGIES:   Allergen Reactions   • Mold   (Environmental) Other (See Comments)   • Pollen Other (See Comments)            ROS  CONSTITUTIONAL -no  complaint  EYES -no complaint  ENT -no complaint  NECK -no complaint  CARDIO -no complaint  RESP -no complaint  GI -no complaint   -no complaint  MS -no complaint  SKIN -no complaint  NEURO -left hand clumsiness, drooping to left side of face  PSYCH -    Comprehensive ROS negative except as noted above.        SOCIAL HISTORY    Patient is a smoker and drinks alcohol and says she last had alcohol last night.    Social History     Tobacco Use   • Smoking status: Current Every Day Smoker   • Smokeless tobacco: Never Used   Substance Use Topics   • Alcohol use: Yes   • Drug use: No            FAMILY HISTORY    Noncontributory     No family history on file.         PHYSICAL EXAM    ED Triage Vitals 04/04/20 0652   BP (!) 177/79   Heart Rate 76   Resp 16   Temp 98.4 °F (36.9 °C)   SpO2 96 %          VITAL SIGNS: Reviewed  CONSTITUTIONAL: Awake/alert, no acute distress, cooperative with exam.  EYES: Pupils PERRL, EOMI, no ictarus, no conjuntival injection, vision is grossly normal.  ENT: MM moist, no intraoral lesions/exudates.  NECK: Normal in appearance, normal ROM, no adenopathy, no menigismus.  CARDIO: RRR, no murmurs, no gallups, pulses are equal, normal cap refill.  RESP: Lungs clear, breath sounds equal, no wheezes/rales/rhonchi.  GI: Abd soft, non-tender, normal active BS, no masses/organomegaly.  :  MS: No deformities, no edema, patient moves all extremities spontaneously.  SKIN: Normal turger, no rashes, no bruising, no cuts/abrasions.  NEURO: Awake/alert, there is a mild left facial droop, there is dysmetria on finger-to-nose with the left hand but the right hand is normal,, slight decreased strength in hand grasp on the left strength is otherwise normal, sensory function normal x4.  PSYCH:    RESULTS    Results reviewed.    Results for orders placed or performed during the hospital encounter of 04/04/20   CBC with Automated Differential   Result Value    WBC 6.4    RBC 5.25 (H)    HGB 17.1 (H)    HCT 52.0  (H)    MCV 99.0    MCH 32.6    MCHC 32.9    RDW-CV 13.2        NRBC 0    DIFF TYPE AUTOMATED DIFFERENTIAL    Neutrophil 58    LYMPH 22    MONO 11    EOSIN 7    BASO 1    Percent Immature Granuloctyes 1    Absolute Neutrophil 3.8    Absolute Lymph 1.4    Absolute Mono 0.7    Absolute Eos 0.4    Absolute Baso 0.1    Absolute Immature Granulocytes 0.1   Comprehensive Metabolic Panel   Result Value    Sodium 144    Potassium 4.4    Chloride 107    Carbon Dioxide 32    Anion Gap 9 (L)    Glucose 92    BUN 21 (H)    Creatinine 0.85    GFR Estimate,  82     Comment: eGFR 60 - 89 mL/min/1.73m2 = Mild decrease in kidney function.    GFR Estimate, Non  70     Comment: eGFR 60 - 89 mL/min/1.73m2 = Mild decrease in kidney function.    BUN/Creatinine Ratio 25    CALCIUM 8.7    TOTAL BILIRUBIN 0.6    AST/SGOT 34    ALT/SGPT 38    ALK PHOSPHATASE 61    TOTAL PROTEIN 6.8    Albumin 3.4 (L)    GLOBULIN 3.4    A/G Ratio, Serum 1.0   Lipid Panel With Reflex   Result Value    CHOLESTEROL 211 (H)     Comment:    Desirable            <200  Borderline High      200 to 239  High                 >=240         CALCULATED      Comment: OPTIMAL               <100  NEAR OPTIMAL          100-129  BORDERLINE HIGH       130-159  HIGH                  160-189  VERY HIGH             >=190      HDL 91     Comment: Low            <40  Borderline Low 40 to 49  Near Optimal   50 to 59  Optimal        >=60      TRIGLYCERIDE 42     Comment: Normal                   <150  Borderline High          150 to 199  High                     200 to 499  Very High                >=500      CALCULATED NON      Comment:    Therapeutic Target:  CHD and risk equivalents <130  Multiple risk factors    <160  0 to 1 risk factors      <190         CHOL/HDL 2.3   Prothrombin Time   Result Value    PROTIME 10.7    INR 1.0     Comment: INR Therapeutic Range: 2.0 to 3.0 (2.5 to 3.5 recommended for recurrent thrombotic  Awake/Alert episodes and mechanical prosthetic heart valves.)    Partial Thromboplastin Time   Result Value    PTT 24     Comment: PTT  Therapeutic Range:  45-70 seconds.   Troponin I Ultra Sensitive   Result Value    TROPONIN I <0.02   Alcohol   Result Value    Alcohol Ethyl 3 (A)   Metered blood glucose   Result Value    Glucose Bedside POC 77            EKG:    No results found for this or any previous visit.     At 0720 EKG demonstrates normal sinus rhythm with PACs at 81 bpm, normal axis, no acute ST segment elevations or depressions, there is an unremarkable EKG.      Imaging:        Imaging Results          CT HEAD WO CONTRAST (In process)                CT head and neck was read by V read as negative for acute pathology.          CLINICAL COURSE    Patient was called in from the field as a stroke alert by EMS.        ED Course as of Apr 04 0735   Sat Apr 04, 2020   0707 Tele-stroke neurologist currently evaluating patient.      0718 Dr. Kahkeshani - Tele-stroke wants TPA started followed by CTA head/neck      0727 D/w Dr. Harris - Aware of admit to ICU      0727 D/w       0728 D/w Dr. Flores - he will see patient later.             Vitals:    04/04/20 0652 04/04/20 0702 04/04/20 0720   BP: (!) 177/79  (!) 141/84   Pulse: 76 72 80   Resp: 16 19 20   Temp: 98.4 °F (36.9 °C)     TempSrc: Temporal     SpO2: 96% 99% 98%   Weight: 42.3 kg (93 lb 4.1 oz)              IMPRESSION        ED Diagnosis        Final diagnosis    Cerebrovascular accident (CVA), unspecified mechanism (CMS/[facility])                     DISPOSITION and PLAN      ADMIT        Follow-up Information    None            Current Discharge Medication List             Critical care time 45 minutes excluding procedures and EKGs.  Body system at risk neurologic.           Zi Blanchard MD  04/04/20 0759

## 2020-07-02 ENCOUNTER — LABORATORY RESULT (OUTPATIENT)
Age: 55
End: 2020-07-02

## 2020-07-02 ENCOUNTER — APPOINTMENT (OUTPATIENT)
Dept: RHEUMATOLOGY | Facility: CLINIC | Age: 55
End: 2020-07-02
Payer: COMMERCIAL

## 2020-07-02 VITALS
WEIGHT: 116 LBS | HEART RATE: 77 BPM | TEMPERATURE: 97.9 F | SYSTOLIC BLOOD PRESSURE: 110 MMHG | BODY MASS INDEX: 21.35 KG/M2 | OXYGEN SATURATION: 98 % | DIASTOLIC BLOOD PRESSURE: 68 MMHG | HEIGHT: 62 IN

## 2020-07-02 DIAGNOSIS — M25.50 PAIN IN UNSPECIFIED JOINT: ICD-10-CM

## 2020-07-02 PROCEDURE — 99204 OFFICE O/P NEW MOD 45 MIN: CPT

## 2020-07-02 NOTE — ASSESSMENT
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 \par \par 1) Inflammatory polyarthropathy:  w/ strong FH mother RA/ or PsA\par Repeated evaluations in past inconsistent w/ SLE or some type of CTD.. \par - Episodes can last weeks- months and totally nl in between.\par - At this time # 1 issue L shoulder and back - throughout and flexor tendon nodules ttt over R/L 2nd digit (unclear if 2/2 overuse given piano playing) or inflammatory- no triggering/ locking at this point \par Rash- dry scaly skin on hands dorsal surface- bx unremarkable- nothing seen on exam today.. occas scaly lesions on scalp\par One thickened toe nail.. otherwise nl.. \par NOTE: \par Excellent response to steroids in past.. \par No personal or FH psoriasis- by description, AS, inflammatory back, bowel or eye dz\par \par 2) HLD- mild \par 3) Hyperglycemia:  HbA1c 5.5 2017\par 4) MDD (possible bipolar II) w/ sleep disorder- Insomnia:  controlled but not as well as would like on current tx, Lexapro 20mg and venlaxifine 37.5 mg... 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 occas marijuana, ambien, Mg..  but nothing consistent... \par NOTE: \par - attempted suicide as 18 now well controlled-\par Tx\par - excellent control on Prozac for many ys.. but only brand, can't afford now\par - Zoloft 100 mg \par - Long hx depression on lexapro 20 mg / venlaxifine 37.5 \par - amitriptyline\par - lithium\par \par Plan: \par - offered steroid injection for L shoulder, declined.. afraid of needles\par - pred 20 mg daily for 2 wks then reeval.. call with response, get labs to include vectra done\par - imaging based on response to steroids.. spine/ shoulder\par - f/u 3 wks.. \par \par

## 2020-07-02 NOTE — CONSULT LETTER
[Consult Letter:] : I had the pleasure of evaluating your patient, [unfilled]. [Dear  ___] : Dear  [unfilled], [Sincerely,] : Sincerely, [Consult Closing:] : Thank you very much for allowing me to participate in the care of this patient.  If you have any questions, please do not hesitate to contact me. [FreeTextEntry2] : Cyrus Astorga MD  [FreeTextEntry1] : Please see below for summary of  recent rheumatology evaluation and recommendations.\par \par 53 yo  hx asthma, and intermittent "inflammatory polyarthralgias"...(since childhood), MDD stable x many ys and under chronic caregiver stress mother/ and adult child w/ aspergers. \par \par 1) Inflammatory polyarthropathy:  w/ strong FH mother RA/ or PsA\par Repeated evaluations in past inconsistent w/ SLE or some type of CTD.. \par - Episodes can last weeks- months and totally nl in between.\par - At this time # 1 issue L shoulder and back - throughout \par Rash- dry scaly skin on hands dorsal surface- bx unremarkable- nothing seen on exam today.. occas scaly lesions on scalp\par One thickened toe nail.. otherwise nl.. \par NOTE: \par Excellent response to steroids in past.. \par No personal or FH psoriasis- by description, AS, inflammatory back, bowel or eye dz\par \par 2) HLD- mild \par 3) Hyperglycemia:  HbA1c 5.5 2017\par 4) MDD (possible bipolar II) w/ sleep disorder- Insomnia:  controlled but not as well as would like on current tx, Lexapro 20mg and venlaxifine 37.5 mg... 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 occas marijuana, ambien, Mg..  but nothing consistent... \par NOTE: \par - attempted suicide as 18 now well controlled-\par Tx\par - excellent control on Prozac for many ys.. but only brand, can't afford now\par - Zoloft 100 mg \par - Long hx depression on lexapro 20 mg / venlaxifine 37.5 \par - amitriptyline\par - lithium\par \par Plan: \par - offered steroid injection for L shoulder, declined.. afraid of needles\par - pred 20 mg daily for 2 wks then reeval.. call with response, get labs to include vectra done\par - imaging based on response to steroids.. spine/ shoulder\par - f/u 3 wks..  [FreeTextEntry3] : Merissa Howell DNP, ANP-C\par Division or Rheumatology\par \par \par

## 2020-07-02 NOTE — DATA REVIEWED
[FreeTextEntry1] : Labs\par 4/17 nl CBC, CMP, TFTs, vit D 48, B12/ Folate, IIron, TIBC, ferritin\par HbA1c 5.5

## 2020-07-02 NOTE — REVIEW OF SYSTEMS
[Feeling Poorly] : feeling poorly [Feeling Tired] : feeling tired [Arthralgias] : arthralgias [Joint Pain] : joint pain [Skin Lesions] : skin lesion [As Noted in HPI] : as noted in HPI [Joint Stiffness] : joint stiffness [Negative] : Heme/Lymph [Recent Weight Gain (___ Lbs)] : no recent weight gain [de-identified] : very faint [FreeTextEntry2] : stable wt  [FreeTextEntry9] : limited L shoulder ROM w/ POM and stiffness throughout spine- neck/ lower back

## 2020-07-02 NOTE — HISTORY OF PRESENT ILLNESS
[FreeTextEntry1] : (Initial HPI 20) \par 53 yo  hx asthma, and intermittent "inflammatory polyarthralgias"...(since childhood) repeated evaluations in past inconsistent w/ SLE possible .. \par Excellent response to steroids in past.. \par This episode started more than 2 ys ago but no insurance and didn't do anything \par "Pain Everywhere" progressively worse, entire spine most severely now all joints painful w/ intermittent "burning sensation hands over MCPs" ROM limited in migratory pattern, currently L shoulder and hips.. hips- groin and lateral hip pain.. not severe swelling\par Hands feel like "sausages"\par Rash- dry scaly skin on hands dorsal surface- bx unremarkable, occas scaly lesions on scalp\par One thickened toe nail.. otherwise nl.. \par Associated \par ROS:  denies fevers, chills, night sweats, lymphadenopathy, arthropathy, rash, alopecia, raynauds, headaches, vision loss, sicca, mucositis, serositis, cp-mild intermittent but nothing recent, noted EKG changes asymptomatic- recommend cardiac eval but never done 2/2 insurance issues, sob, cough, GERD, n/v/d/c or abd bloating, \par HYperglycemia/ HLD \par \par Sleep: trouble falling/ staying asleep.. occas marijuana, ambien, Mg..  but nothing consistent... \par \par Pregnancy:  1 elective Ab.. hyperemesis, depression.. No hx bleeding/ clotting dyscrasias or cytopenias, paresthesias or weakness, renal or hepatic dysfunction. \par \par \par \par Long hx depression (bipolar):  well controlled since early childhood Prozac w/ + response but generic never worked.. \par NOTE: \par - attempted suicide as 18 now well controlled-\par Tx\par - Zoloft 100 mg \par - Long hx depression on lexapro 20 mg / venlaxifine 37.5 \par - amitriptyline\par - lithium\par \par \par + FH mother w/ PsA vs. RA on Xeljanz \par \par

## 2020-07-06 LAB
25(OH)D3 SERPL-MCNC: 30.2 NG/ML
ACE BLD-CCNC: 31 U/L
ALBUMIN SERPL ELPH-MCNC: 5.3 G/DL
ALP BLD-CCNC: 88 U/L
ALT SERPL-CCNC: 37 U/L
ANA PAT FLD IF-IMP: ABNORMAL
ANA SER IF-ACNC: ABNORMAL
ANION GAP SERPL CALC-SCNC: 18 MMOL/L
APPEARANCE: CLEAR
AST SERPL-CCNC: 37 U/L
BASOPHILS # BLD AUTO: 0.03 K/UL
BASOPHILS NFR BLD AUTO: 0.7 %
BILIRUB SERPL-MCNC: 0.4 MG/DL
BILIRUBIN URINE: NEGATIVE
BLOOD URINE: NEGATIVE
BUN SERPL-MCNC: 18 MG/DL
C3 SERPL-MCNC: 116 MG/DL
C4 SERPL-MCNC: 22 MG/DL
CALCIUM SERPL-MCNC: 10 MG/DL
CALCIUM SERPL-MCNC: 10 MG/DL
CCP AB SER IA-ACNC: <8 UNITS
CHLORIDE SERPL-SCNC: 99 MMOL/L
CHOLEST SERPL-MCNC: 261 MG/DL
CHOLEST/HDLC SERPL: 2.7 RATIO
CK SERPL-CCNC: 49 U/L
CO2 SERPL-SCNC: 23 MMOL/L
COLOR: YELLOW
CREAT SERPL-MCNC: 0.73 MG/DL
CRP SERPL-MCNC: 0.19 MG/DL
DSDNA AB SER-ACNC: <12 IU/ML
ENA RNP AB SER IA-ACNC: 0.5 AL
ENA SM AB SER IA-ACNC: <0.2 AL
ENA SS-A AB SER IA-ACNC: <0.2 AL
ENA SS-B AB SER IA-ACNC: <0.2 AL
EOSINOPHIL # BLD AUTO: 0.03 K/UL
EOSINOPHIL NFR BLD AUTO: 0.7 %
ERYTHROCYTE [SEDIMENTATION RATE] IN BLOOD BY WESTERGREN METHOD: 34 MM/HR
ESTIMATED AVERAGE GLUCOSE: 108 MG/DL
FERRITIN SERPL-MCNC: 81 NG/ML
GLUCOSE QUALITATIVE U: NEGATIVE
GLUCOSE SERPL-MCNC: 96 MG/DL
HBA1C MFR BLD HPLC: 5.4 %
HCT VFR BLD CALC: 44.1 %
HDLC SERPL-MCNC: 96 MG/DL
HGB BLD-MCNC: 14.2 G/DL
IMM GRANULOCYTES NFR BLD AUTO: 0.2 %
KETONES URINE: NEGATIVE
LDLC SERPL CALC-MCNC: 155 MG/DL
LEUKOCYTE ESTERASE URINE: NEGATIVE
LYMPHOCYTES # BLD AUTO: 1.12 K/UL
LYMPHOCYTES NFR BLD AUTO: 25.2 %
MAN DIFF?: NORMAL
MCHC RBC-ENTMCNC: 31.6 PG
MCHC RBC-ENTMCNC: 32.2 GM/DL
MCV RBC AUTO: 98.2 FL
MONOCYTES # BLD AUTO: 0.36 K/UL
MONOCYTES NFR BLD AUTO: 8.1 %
NEUTROPHILS # BLD AUTO: 2.9 K/UL
NEUTROPHILS NFR BLD AUTO: 65.1 %
NITRITE URINE: NEGATIVE
PARATHYROID HORMONE INTACT: 33 PG/ML
PH URINE: 6
PLATELET # BLD AUTO: 364 K/UL
POTASSIUM SERPL-SCNC: 4.2 MMOL/L
PROT SERPL-MCNC: 7.6 G/DL
PROTEIN URINE: NORMAL
RBC # BLD: 4.49 M/UL
RBC # FLD: 12.7 %
RF+CCP IGG SER-IMP: NEGATIVE
RHEUMATOID FACT SER QL: <10 IU/ML
SARS-COV-2 IGG SERPL IA-ACNC: 0.09 INDEX
SARS-COV-2 IGG SERPL QL IA: NEGATIVE
SODIUM SERPL-SCNC: 140 MMOL/L
SPECIFIC GRAVITY URINE: 1.03
THYROGLOB AB SERPL-ACNC: <20 IU/ML
THYROPEROXIDASE AB SERPL IA-ACNC: 10.6 IU/ML
TRIGL SERPL-MCNC: 56 MG/DL
TSH SERPL-ACNC: 2.38 UIU/ML
URATE SERPL-MCNC: 3.1 MG/DL
UROBILINOGEN URINE: NORMAL
WBC # FLD AUTO: 4.45 K/UL

## 2020-07-08 LAB
ALBUMIN MFR SERPL ELPH: 62.5 %
ALBUMIN SERPL-MCNC: 4.8 G/DL
ALBUMIN/GLOB SERPL: 1.7 RATIO
ALPHA1 GLOB MFR SERPL ELPH: 3.5 %
ALPHA1 GLOB SERPL ELPH-MCNC: 0.3 G/DL
ALPHA2 GLOB MFR SERPL ELPH: 8.5 %
ALPHA2 GLOB SERPL ELPH-MCNC: 0.6 G/DL
B BURGDOR AB SER-IMP: NEGATIVE
B BURGDOR IGM PATRN SER IB-IMP: NEGATIVE
B BURGDOR18KD IGG SER QL IB: NORMAL
B BURGDOR23KD IGG SER QL IB: NORMAL
B BURGDOR23KD IGM SER QL IB: NORMAL
B BURGDOR28KD IGG SER QL IB: NORMAL
B BURGDOR30KD IGG SER QL IB: NORMAL
B BURGDOR31KD IGG SER QL IB: NORMAL
B BURGDOR39KD IGG SER QL IB: NORMAL
B BURGDOR39KD IGM SER QL IB: NORMAL
B BURGDOR41KD IGG SER QL IB: NORMAL
B BURGDOR41KD IGM SER QL IB: NORMAL
B BURGDOR45KD IGG SER QL IB: NORMAL
B BURGDOR58KD IGG SER QL IB: NORMAL
B BURGDOR66KD IGG SER QL IB: NORMAL
B BURGDOR93KD IGG SER QL IB: NORMAL
B-GLOBULIN MFR SERPL ELPH: 12.1 %
B-GLOBULIN SERPL ELPH-MCNC: 0.9 G/DL
GAMMA GLOB FLD ELPH-MCNC: 1 G/DL
GAMMA GLOB MFR SERPL ELPH: 13.4 %
INTERPRETATION SERPL IEP-IMP: NORMAL
M PROTEIN MFR SERPL ELPH: NORMAL
MONOCLON BAND OBS SERPL: NORMAL
PROT SERPL-MCNC: 7.6 G/DL
PROT SERPL-MCNC: 7.6 G/DL

## 2020-07-16 ENCOUNTER — TRANSCRIPTION ENCOUNTER (OUTPATIENT)
Age: 55
End: 2020-07-16

## 2020-07-30 ENCOUNTER — APPOINTMENT (OUTPATIENT)
Dept: RHEUMATOLOGY | Facility: CLINIC | Age: 55
End: 2020-07-30
Payer: COMMERCIAL

## 2020-07-30 VITALS
HEART RATE: 67 BPM | DIASTOLIC BLOOD PRESSURE: 80 MMHG | HEIGHT: 62 IN | TEMPERATURE: 97 F | BODY MASS INDEX: 22.08 KG/M2 | OXYGEN SATURATION: 98 % | SYSTOLIC BLOOD PRESSURE: 120 MMHG | WEIGHT: 120 LBS

## 2020-07-30 DIAGNOSIS — T37.8X5A ADVERSE EFFECT OF OTHER SPECIFIED SYSTEMIC ANTI-INFECTIVES AND ANTIPARASITICS, INITIAL ENCOUNTER: ICD-10-CM

## 2020-07-30 PROCEDURE — 99214 OFFICE O/P EST MOD 30 MIN: CPT

## 2020-07-30 NOTE — DATA REVIEWED
[FreeTextEntry1] : Labs: \par 7/6/20 JEZ 1:640, ESR 34, CRP 0.19, ,  nl UA, CBC, CMP, Ferritin, C3/4TSH, Ca/ PTH, SPEP but weak IgG kappa band, CK, Uric acid 2.1, HbA1c 5.4 w/ VECTRA 23 \par Vit D 30, , TG 56, , HD 96\par Neg dsDNA, DAVID, SSA/B, RF/ CCP, lyme, ACE, Covid 19, TPO/ Tg\par \par 4/17 nl CBC, CMP, TFTs, vit D 48, B12/ Folate, IIron, TIBC, ferritin\par HbA1c 5.5

## 2020-07-30 NOTE — PHYSICAL EXAM
[General Appearance - Well Developed] : well developed [General Appearance - Alert] : alert [Sclera] : the sclera and conjunctiva were normal [Outer Ear] : the ears and nose were normal in appearance [Neck Appearance] : the appearance of the neck was normal [Oropharynx] : the oropharynx was normal [Neck Cervical Mass (___cm)] : no neck mass was observed [Jugular Venous Distention Increased] : there was no jugular-venous distention [Thyroid Nodule] : there were no palpable thyroid nodules [Thyroid Diffuse Enlargement] : the thyroid was not enlarged [Auscultation Breath Sounds / Voice Sounds] : lungs were clear to auscultation bilaterally [Heart Rate And Rhythm] : heart rate was normal and rhythm regular [Heart Sounds] : normal S1 and S2 [Heart Sounds Gallop] : no gallops [Murmurs] : no murmurs [Heart Sounds Pericardial Friction Rub] : no pericardial rub [Edema] : there was no peripheral edema [Full Pulse] : the pedal pulses are present [Cervical Lymph Nodes Enlarged Anterior Bilaterally] : anterior cervical [Cervical Lymph Nodes Enlarged Posterior Bilaterally] : posterior cervical [Supraclavicular Lymph Nodes Enlarged Bilaterally] : supraclavicular [No CVA Tenderness] : no ~M costovertebral angle tenderness [No Spinal Tenderness] : no spinal tenderness [Skin Color & Pigmentation] : normal skin color and pigmentation [] : no rash [Skin Turgor] : normal skin turgor [Sensation] : the sensory exam was normal to light touch and pinprick [Motor Exam] : the motor exam was normal [No Focal Deficits] : no focal deficits [Oriented To Time, Place, And Person] : oriented to person, place, and time [Affect] : the affect was normal [Impaired Insight] : insight and judgment were intact [FreeTextEntry1] : depression controlled, no SI - much improved w/ resolution of severe symmetrical synovitis

## 2020-07-30 NOTE — HISTORY OF PRESENT ILLNESS
[FreeTextEntry1] : 20 \par - Excellent response to steroids 20 mg x 2 wks then tapered off... "everything" felt better.  all joint pain/ swelling fully resolved... energy level considerably better - persistent chronic fatigue resolved.  \par - triggering and pain over several fingers- most severe discomfort over davis surface of MCPs no longer painful.  \par - no recent pulm issues\par - comprehensive labs + JEZ 1:640H w/ neg subserologies and ESR 34 \par - ROS now otherwise negative, continues to be under tremendous stress at home\par \par 1) UCTD  \par __________________________________________________________________________\par (Initial HPI 20) \par 53 yo  hx asthma, and intermittent "inflammatory polyarthralgias"...(since childhood) repeated evaluations in past inconsistent w/ SLE possible .. \par Excellent response to steroids in past.. \par This episode started more than 2 ys ago but no insurance and didn't do anything \par "Pain Everywhere" progressively worse, entire spine most severely now all joints painful w/ intermittent "burning sensation hands over MCPs" ROM limited in migratory pattern, currently L shoulder and hips.. hips- groin and lateral hip pain.. not severe swelling\par Hands feel like "sausages"\par Rash- dry scaly skin on hands dorsal surface- bx unremarkable, occas scaly lesions on scalp\par One thickened toe nail.. otherwise nl.. \par Associated \par ROS:  denies fevers, chills, night sweats, lymphadenopathy, arthropathy, rash, alopecia, raynauds, headaches, vision loss, sicca, mucositis, serositis, cp-mild intermittent but nothing recent, noted EKG changes asymptomatic- recommend cardiac eval but never done 2/2 insurance issues, sob, cough, GERD, n/v/d/c or abd bloating, \par HYperglycemia/ HLD \par \par Sleep: trouble falling/ staying asleep.. occas marijuana, ambien, Mg..  but nothing consistent... \par \par Pregnancy:  1 elective Ab.. hyperemesis, depression.. No hx bleeding/ clotting dyscrasias or cytopenias, paresthesias or weakness, renal or hepatic dysfunction. \par \par \par \par Long hx depression (bipolar):  well controlled since early childhood Prozac w/ + response but generic never worked.. \par NOTE: \par - attempted suicide as 18 now well controlled-\par Tx\par - Zoloft 100 mg \par - Long hx depression on lexapro 20 mg / venlaxifine 37.5 \par - amitriptyline\par - lithium\par \par \par + FH mother w/ PsA vs. RA on Xeljanz \par \par

## 2020-07-30 NOTE — REVIEW OF SYSTEMS
[Skin Lesions] : skin lesion [As Noted in HPI] : as noted in HPI [Negative] : Heme/Lymph [Feeling Poorly] : not feeling poorly [Feeling Tired] : not feeling tired [Recent Weight Gain (___ Lbs)] : no recent weight gain [Dry Eyes] : dryness of the eyes [Arthralgias] : no arthralgias [Joint Pain] : no joint pain [Joint Stiffness] : no joint stiffness [FreeTextEntry2] : stable wt - overall feeling much better  [FreeTextEntry3] : mild  [FreeTextEntry4] : no mucositis [FreeTextEntry9] : Full resolution of all  stiffness and joint limited L shoulder ROM w/ POM and stiffness throughout spine- neck/ lower back  [de-identified] : very faint- not appreciated at all now

## 2020-07-30 NOTE — ASSESSMENT
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 \par \par 1) UCTD/ Inflammatory polyarthropathy:  + JEZ 1:640H w/ neg subserologies to include ACE... w/ strong FH mother RA/ or PsA\par Repeated evaluations in past inconsistent w/ SLE or some type of CTD.. \par Again excellent response to steroids as before, dramatic improvement in function... after lengthy discussion regarding recurrent episodes will consider emperic trial of HCQ for systemic inflammatory arthropathy... safest option at this point... may use low dose steroids PRN for acute return.  \par Scalp dry scaly lesions fully resolved \par NOTE:\par 7/6/20 full rheum eval neg otherwise\par - Episodes can last weeks- months and totally nl in between.\par - Initial Eval:  L shoulder and back - throughout and flexor tendon nodules ttt over R/L 2nd digit - no triggering/ locking \par Rash- dry scaly skin on hands dorsal surface- bx unremarkable- nothing seen on exam today.. occas scaly lesions on scalp\par One thickened toe nail.. otherwise nl.. \par Repeatedly excellent response to steroids in past.. \par No personal or FH psoriasis- by description- though mother dx RA... , AS, inflammatory back, bowel or eye dz\par \par Discussion:  comprehenaive rheum eval otherwise neg beyond obvious sysmmetrical synovitis and + JEZ 1:640H\par \par 2) HLD- mild \par 3) Hyperglycemia:  HbA1c 5.5 2017\par 4) MDD (possible bipolar II) w/ sleep disorder- Insomnia:  controlled but not as well as would like on current tx, Lexapro 20mg and venlaxifine 37.5 mg... 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 occas marijuana, ambien, Mg..  but nothing consistent... \par NOTE: \par - attempted suicide as 18 now well controlled-\par Tx\par - excellent control on Prozac for many ys.. but only brand, can't afford now\par - Zoloft 100 mg \par - Long hx depression on lexapro 20 mg / venlaxifine 37.5 \par - amitriptyline\par - lithium\par \par Plan: \par - use prednisone as needed for "flares" take 5-4-3-2-1 short term, keep track of how often you need to do this.. if needed more than once month- call me\par \par - start  mg twice daily, if tolerated well ok to take both together at same \par \par - baseline eye exam, call and ask if you need an updated exam, make sure they send report to our office \par \par - return to derm for bx of scalp... if the psoriasis gets worse call immediately\par \par - f/u in 3 mnths  \par \par

## 2020-08-03 ENCOUNTER — TRANSCRIPTION ENCOUNTER (OUTPATIENT)
Age: 55
End: 2020-08-03

## 2020-08-11 ENCOUNTER — TRANSCRIPTION ENCOUNTER (OUTPATIENT)
Age: 55
End: 2020-08-11

## 2020-08-13 ENCOUNTER — TRANSCRIPTION ENCOUNTER (OUTPATIENT)
Age: 55
End: 2020-08-13

## 2020-08-25 ENCOUNTER — RX RENEWAL (OUTPATIENT)
Age: 55
End: 2020-08-25

## 2020-08-25 ENCOUNTER — TRANSCRIPTION ENCOUNTER (OUTPATIENT)
Age: 55
End: 2020-08-25

## 2020-08-27 ENCOUNTER — TRANSCRIPTION ENCOUNTER (OUTPATIENT)
Age: 55
End: 2020-08-27

## 2020-09-09 ENCOUNTER — APPOINTMENT (OUTPATIENT)
Dept: RHEUMATOLOGY | Facility: CLINIC | Age: 55
End: 2020-09-09
Payer: COMMERCIAL

## 2020-09-09 DIAGNOSIS — M75.00 ADHESIVE CAPSULITIS OF UNSPECIFIED SHOULDER: ICD-10-CM

## 2020-09-09 PROCEDURE — 99214 OFFICE O/P EST MOD 30 MIN: CPT | Mod: 95

## 2020-09-09 RX ORDER — VENLAFAXINE 37.5 MG/1
37.5 TABLET ORAL DAILY
Qty: 30 | Refills: 2 | Status: DISCONTINUED | COMMUNITY
End: 2020-09-09

## 2020-09-09 NOTE — REVIEW OF SYSTEMS
[Dry Eyes] : dryness of the eyes [As Noted in HPI] : as noted in HPI [Skin Lesions] : skin lesion [Negative] : Heme/Lymph [Feeling Poorly] : not feeling poorly [Feeling Tired] : not feeling tired [Recent Weight Gain (___ Lbs)] : no recent weight gain [Arthralgias] : no arthralgias [Joint Pain] : no joint pain [Joint Stiffness] : no joint stiffness [FreeTextEntry2] : stable wt - overall feeling much better  [FreeTextEntry3] : mild  [FreeTextEntry9] : Full resolution of all  stiffness and joint limited L shoulder ROM w/ POM and stiffness throughout spine- neck/ lower back  [FreeTextEntry4] : no mucositis [de-identified] : very faint- not appreciated at all now

## 2020-09-09 NOTE — HISTORY OF PRESENT ILLNESS
[Home] : at home, [unfilled] , at the time of the visit. [Medical Office: ([facility])___] : at the medical office located in  [Verbal consent obtained from patient] : the patient, [unfilled] [FreeTextEntry1] : Verbal consent given on 2020 at 12:13 by YOLA CAVAZOS, [].\par AMWELL \par \par 19 \par - severe nausea and dizziness with generic HCQ, had to lower dose to 200 mg daily and still severe distress...now off\par - intermittent use of low dose pred 10 mg with immediate and nearly full resolution of symmetrical joint pain and stiffness.. withouth anything stiffness > 60 mins and joints "throbbing".\par - Major depression well controlled on current regimen of 30 mg lexapro daily, is not taking venlaxifine.. rare use of ambien for insomnia and even less rare us of lorazepam for severe anxiety usually stress induced (family issues)\par - Neck pain persists, working w/ PT, little improvement as yet.. trying to be c/w exercises at home. No formal exercise regimen.. but understands need for something\par - has not seen opth yet.. appt scheduled \par - no recent triggering, rash controlled on scalp\par - no pulm sx at this point \par - comprehensive labs + JEZ 1:640H w/ neg subserologies and ESR 34 \par - ROS now otherwise negative, continues to be under tremendous stress at home\par \par 1) UCTD  \par 2) MDD and anxiety reactive w/ chronic caregiver stress \par 3) HLD- mild \par 4) Hyperglycemia:  HbA1c 5.5 2017\par __________________________________________________________________________\par (Initial HPI 20) \par 55 yo  hx asthma, and intermittent "inflammatory polyarthralgias"...(since childhood) repeated evaluations in past inconsistent w/ SLE possible .. \par Excellent response to steroids in past.. \par This episode started more than 2 ys ago but no insurance and didn't do anything \par "Pain Everywhere" progressively worse, entire spine most severely now all joints painful w/ intermittent "burning sensation hands over MCPs" ROM limited in migratory pattern, currently L shoulder and hips.. hips- groin and lateral hip pain.. not severe swelling\par Hands feel like "sausages"\par Rash- dry scaly skin on hands dorsal surface- bx unremarkable, occas scaly lesions on scalp\par One thickened toe nail.. otherwise nl.. \par Associated \par ROS:  denies fevers, chills, night sweats, lymphadenopathy, arthropathy, rash, alopecia, raynauds, headaches, vision loss, sicca, mucositis, serositis, cp-mild intermittent but nothing recent, noted EKG changes asymptomatic- recommend cardiac eval but never done 2/2 insurance issues, sob, cough, GERD, n/v/d/c or abd bloating, \par HYperglycemia/ HLD \par \par Sleep: trouble falling/ staying asleep.. occas marijuana, ambien, Mg..  but nothing consistent... \par \par Pregnancy:  1 elective Ab.. hyperemesis, depression.. No hx bleeding/ clotting dyscrasias or cytopenias, paresthesias or weakness, renal or hepatic dysfunction. \par \par \par \par Long hx depression (bipolar):  well controlled since early childhood Prozac w/ + response but generic never worked.. \par NOTE: \par - attempted suicide as 18 now well controlled-\par Tx\par - Zoloft 100 mg \par - Long hx depression on lexapro 20 mg / venlaxifine 37.5 \par - amitriptyline\par - lithium\par \par \par + FH mother w/ PsA vs. RA on Xeljanz \par \par

## 2020-09-09 NOTE — PHYSICAL EXAM
[General Appearance - Well Developed] : well developed [General Appearance - Alert] : alert [Sclera] : the sclera and conjunctiva were normal [Outer Ear] : the ears and nose were normal in appearance [Oropharynx] : the oropharynx was normal [No CVA Tenderness] : no ~M costovertebral angle tenderness [Skin Color & Pigmentation] : normal skin color and pigmentation [] : no rash [Skin Turgor] : normal skin turgor [Oriented To Time, Place, And Person] : oriented to person, place, and time [Affect] : the affect was normal [Impaired Insight] : insight and judgment were intact [FreeTextEntry1] : no obvious weakness observed

## 2020-09-09 NOTE — ASSESSMENT
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 \par \par 1) UCTD/ Inflammatory polyarthropathy:  + JEZ 1:640H w/ neg subserologies to include ACE... w/ strong FH mother RA/ or PsA\par Repeated evaluations in past inconsistent w/ SLE or some type of CTD.. \par Again excellent response to steroids repeatedly, dramatic improvement in function... after lengthy discussion regarding recurrent episodes will consider emperic trial of HCQ for systemic inflammatory arthropathy... safest option at this point... may use low dose steroids PRN for acute return. Generic HCQ severe nausea/ dizziness, now will try brand (STEFANI) written, if not tolerated will need to consider MTX.. or SSA  \par Scalp dry scaly lesions fully resolved \par NOTE:\par 7/6/20 full rheum eval neg otherwise\par - Episodes can last weeks- months and totally nl in between.\par - Initial Eval:  L shoulder and back - throughout and flexor tendon nodules ttt over R/L 2nd digit - no triggering/ locking \par Rash- dry scaly skin on hands dorsal surface- bx unremarkable- nothing seen on exam today.. occas scaly lesions on scalp\par One thickened toe nail.. otherwise nl.. \par Repeatedly excellent response to steroids in past.. \par No personal or FH psoriasis- by description- though mother dx RA... , AS, inflammatory back, bowel or eye dz\par \par Discussion:  comprehensive rheum eval otherwise neg beyond obvious sysmmetrical synovitis/ enthesitis and + JEZ 1:640H\par \par 2) HLD- mild \par 3) Hyperglycemia:  HbA1c 5.5 2017\par 4) MDD (possible bipolar II) w/ sleep disorder- Insomnia:  controlled but not as well as would like on current tx, Lexapro now at 30 mg and off venlaxifine 37.5 mg... 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 occas marijuana, ambien (rare), Mg..  but nothing consistent... \par Anxiety: at times severe r/t family issues. Very rare use of lorazepam\par NOTE: \par - attempted suicide as 18 now well controlled- working w/ group psychopharm, needs individual therapy.. \par Tx\par - excellent control on Prozac for many ys.. but only brand, can't afford now\par - Zoloft 100 mg \par - Long hx depression on lexapro 20 mg / venlaxifine 37.5 \par - amitriptyline\par - lithium\par \par Plan: \par - use prednisone as needed for "flares" take 5-4-3-2-1 short term, keep track of how often you need to do this.. if needed more than once month- call me\par \par - start  mg twice daily (brand), if tolerated well ok to take both together at same \par \par - baseline eye exam, call and ask if you need an updated exam, make sure they send report to our office \par \par - return to derm for bx of scalp... if the psoriasis gets worse call immediately\par \par - will write for lexapro for now but needs to find therapist, if not will be concerned about continuing to provide.  Will find someone in her area \par \par - f/u in 3 mnths  \par \par

## 2020-09-10 ENCOUNTER — TRANSCRIPTION ENCOUNTER (OUTPATIENT)
Age: 55
End: 2020-09-10

## 2020-09-15 ENCOUNTER — TRANSCRIPTION ENCOUNTER (OUTPATIENT)
Age: 55
End: 2020-09-15

## 2020-09-16 ENCOUNTER — TRANSCRIPTION ENCOUNTER (OUTPATIENT)
Age: 55
End: 2020-09-16

## 2020-09-17 ENCOUNTER — TRANSCRIPTION ENCOUNTER (OUTPATIENT)
Age: 55
End: 2020-09-17

## 2020-10-19 ENCOUNTER — TRANSCRIPTION ENCOUNTER (OUTPATIENT)
Age: 55
End: 2020-10-19

## 2020-12-17 ENCOUNTER — APPOINTMENT (OUTPATIENT)
Dept: RHEUMATOLOGY | Facility: CLINIC | Age: 55
End: 2020-12-17
Payer: COMMERCIAL

## 2020-12-17 PROCEDURE — 99443: CPT

## 2020-12-17 NOTE — PHYSICAL EXAM
[Oriented To Time, Place, And Person] : oriented to person, place, and time [Impaired Insight] : insight and judgment were intact [Affect] : the affect was normal [FreeTextEntry1] : depression controlled, no SI - much improved w/ resolution of severe symmetrical synovitis

## 2020-12-17 NOTE — ASSESSMENT
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 \par \par 1) UCTD/ Inflammatory polyarthropathy:  + JEZ 1:640H w/ neg subserologies to include ACE... w/ strong FH mother RA/ or PsA\par Repeated evaluations in past inconsistent w/ SLE or some type of CTD.. updated Vectra 23 7/20 but ESr 34.. \par Again excellent response to steroids repeatedly, dramatic improvement in function... after lengthy discussion regarding recurrent episodes will consider emperic trial of HCQ for systemic inflammatory arthropathy... safest option at this point... may use low dose steroids PRN for acute return. Generic HCQ severe nausea/ dizziness, now on brand (STEFANI) written but still GI distress, advised to divide dose and then if not effective look at dietary issues. If we need to stop will consider MTX (would need to be SQ)/ AZA.. as options.. or SSA\par Scalp dry scaly lesions fully resolved \par NOTE:\par 7/6/20 full rheum eval neg otherwise\par - Episodes can last weeks- months and totally nl in between.\par - Initial Eval:  L shoulder and back - throughout and flexor tendon nodules ttt over R/L 2nd digit - no triggering/ locking \par Rash- dry scaly skin on hands dorsal surface- bx unremarkable- nothing seen on exam today.. occas scaly lesions on scalp\par One thickened toe nail.. otherwise nl.. \par Repeatedly excellent response to steroids in past.. \par No personal or FH psoriasis- by description- though mother dx RA... , AS, inflammatory back, bowel or eye dz\par \par Discussion:  comprehensive rheum eval otherwise neg beyond obvious sysmmetrical synovitis/ enthesitis and + JEZ 1:640H.  Looks more like SpA but labs more c/w CTD\par \par 2) HLD- mild \par 3) Hyperglycemia:  HbA1c 5.5 2017\par 4) MDD (possible bipolar II) w/ sleep disorder- Insomnia:  controlled but not as well as would like on current tx, Lexapro now at 30 mg and off venlaxifine 37.5 mg... 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 occas effective low dose ambien.. Trial PT not tolerated, didn't think it was helpful, massage to neck twice wkly + rsponse .. will try HS relaxant only- using Tizanidine 4-8 mg.. and advised mechanical massage device, twice daily \par Sleep: trouble falling/ staying asleep.. occas marijuana, ambien (rare), Mg..  but nothing consistent... as noted recommend tizanidine and use consistently \par Anxiety: at times severe r/t family issues. Very rare use of lorazepam\par NOTE: \par - attempted suicide as 18 now well controlled- working w/ group psychopharm, needs individual therapy.. \par Tx\par - excellent control on Prozac for many ys.. but only brand, can't afford now\par - Zoloft 100 mg \par - Long hx depression on lexapro 20 mg / venlaxifine 37.5 \par - amitriptyline\par - lithium\par \par Plan: \par - Need to confirm eye exam.. baseline screening \par \par - change to twice daily for better tolerance..   mg twice daily (brand),\par \par - return to derm for bx of scalp... if the psoriasis gets worse call immediately- would then tx as PsA.. different tx options. Might consider Otezla \par \par - will write for lexapro for now but needs to find therapist, if not will be concerned about continuing to provide.  Will find someone in her area \par \par - f/u in 3 mnths  \par \par Audio only:  25 mins \par \par

## 2020-12-17 NOTE — HISTORY OF PRESENT ILLNESS
[Home] : at home, [unfilled] , at the time of the visit. [Medical Office: ([facility])___] : at the medical office located in  [Verbal consent obtained from patient] : the patient, [unfilled] [FreeTextEntry1] : Verbal consent given on 2020 at 12:13 by YOLA CAVAZOS, [].\par AMWELL failed.. \par \par 20 \par - overall better.. \par - neck pain little improvement.. limited ROM..but tolerable. High dose steroids for few days did NOT help.\par - still no overt synovitis\par - ROS:  + neck pain/ stiffness number one issue but no fevers, chills, lymphadenopathy, no URI, cardiopulmonary, raynauds, peripheral edema.. \par - has not seen opth yet.- needed to make appt w/ different opth for screening studies, pending \par - no recent triggering, rash controlled on scalp\par - no pulm sx at this point \par - comprehensive labs + JEZ 1:640H w/ neg subserologies and ESR 34 \par - ROS now otherwise negative, continues to be under tremendous stress at home\par \par 1) UCTD  \par 2) MDD and anxiety reactive w/ chronic caregiver stress \par 3) HLD- mild \par 4) Hyperglycemia:  HbA1c 5.5 2017\par __________________________________________________________________________\par (Initial HPI 20) \par 53 yo  hx asthma, and intermittent "inflammatory polyarthralgias"...(since childhood) repeated evaluations in past inconsistent w/ SLE possible .. \par Excellent response to steroids in past.. \par This episode started more than 2 ys ago but no insurance and didn't do anything \par "Pain Everywhere" progressively worse, entire spine most severely now all joints painful w/ intermittent "burning sensation hands over MCPs" ROM limited in migratory pattern, currently L shoulder and hips.. hips- groin and lateral hip pain.. not severe swelling\par Hands feel like "sausages"\par Rash- dry scaly skin on hands dorsal surface- bx unremarkable, occas scaly lesions on scalp\par One thickened toe nail.. otherwise nl.. \par Associated \par ROS:  denies fevers, chills, night sweats, lymphadenopathy, arthropathy, rash, alopecia, raynauds, headaches, vision loss, sicca, mucositis, serositis, cp-mild intermittent but nothing recent, noted EKG changes asymptomatic- recommend cardiac eval but never done 2/2 insurance issues, sob, cough, GERD, n/v/d/c or abd bloating, \par HYperglycemia/ HLD \par \par Sleep: trouble falling/ staying asleep.. occas marijuana, ambien, Mg..  but nothing consistent... \par \par Pregnancy:  1 elective Ab.. hyperemesis, depression.. No hx bleeding/ clotting dyscrasias or cytopenias, paresthesias or weakness, renal or hepatic dysfunction. \par \par \par \par Long hx depression (bipolar):  well controlled since early childhood Prozac w/ + response but generic never worked.. \par NOTE: \par - attempted suicide as 18 now well controlled-\par Tx\par - Zoloft 100 mg \par - Long hx depression on lexapro 20 mg / venlaxifine 37.5 \par - amitriptyline\par - lithium\par \par \par + FH mother w/ PsA vs. RA on Xeljanz \par \par

## 2020-12-17 NOTE — REVIEW OF SYSTEMS
[Dry Eyes] : dryness of the eyes [As Noted in HPI] : as noted in HPI [Skin Lesions] : skin lesion [Negative] : Heme/Lymph [Feeling Poorly] : not feeling poorly [Feeling Tired] : not feeling tired [Recent Weight Gain (___ Lbs)] : no recent weight gain [Arthralgias] : no arthralgias [Joint Pain] : no joint pain [Joint Stiffness] : no joint stiffness [FreeTextEntry2] : stable wt - overall feeling much better  [FreeTextEntry3] : mild  [FreeTextEntry4] : no mucositis [FreeTextEntry9] : Full resolution of all  stiffness and joint limited L shoulder ROM w/ POM and stiffness throughout spine- neck/ lower back  [de-identified] : very faint- not appreciated at all now

## 2021-02-13 ENCOUNTER — APPOINTMENT (OUTPATIENT)
Dept: OBGYN | Facility: CLINIC | Age: 56
End: 2021-02-13
Payer: COMMERCIAL

## 2021-02-13 ENCOUNTER — APPOINTMENT (OUTPATIENT)
Dept: OBGYN | Facility: CLINIC | Age: 56
End: 2021-02-13

## 2021-02-13 VITALS
RESPIRATION RATE: 16 BRPM | BODY MASS INDEX: 22.26 KG/M2 | HEIGHT: 62 IN | DIASTOLIC BLOOD PRESSURE: 76 MMHG | WEIGHT: 121 LBS | SYSTOLIC BLOOD PRESSURE: 110 MMHG

## 2021-02-13 VITALS
HEIGHT: 62 IN | SYSTOLIC BLOOD PRESSURE: 110 MMHG | WEIGHT: 120 LBS | DIASTOLIC BLOOD PRESSURE: 76 MMHG | BODY MASS INDEX: 22.08 KG/M2 | RESPIRATION RATE: 16 BRPM

## 2021-02-13 DIAGNOSIS — B00.1 HERPESVIRAL VESICULAR DERMATITIS: ICD-10-CM

## 2021-02-13 DIAGNOSIS — R23.2 FLUSHING: ICD-10-CM

## 2021-02-13 DIAGNOSIS — Z80.3 FAMILY HISTORY OF MALIGNANT NEOPLASM OF BREAST: ICD-10-CM

## 2021-02-13 DIAGNOSIS — Z01.419 ENCOUNTER FOR GYNECOLOGICAL EXAMINATION (GENERAL) (ROUTINE) W/OUT ABNORMAL FINDINGS: ICD-10-CM

## 2021-02-13 DIAGNOSIS — M32.9 SYSTEMIC LUPUS ERYTHEMATOSUS, UNSPECIFIED: ICD-10-CM

## 2021-02-13 PROBLEM — Z00.00 ENCOUNTER FOR PREVENTIVE HEALTH EXAMINATION: Noted: 2021-02-13

## 2021-02-13 PROCEDURE — 99386 PREV VISIT NEW AGE 40-64: CPT

## 2021-02-13 PROCEDURE — 99072 ADDL SUPL MATRL&STAF TM PHE: CPT

## 2021-02-13 RX ORDER — PREDNISONE 5 MG/1
5 TABLET ORAL
Qty: 120 | Refills: 1 | Status: DISCONTINUED | COMMUNITY
Start: 2020-07-02 | End: 2021-02-13

## 2021-02-13 NOTE — HISTORY OF PRESENT ILLNESS
[TextBox_4] : last exam 2016\par LMP 1.5 yrs, no vb since\par Not taking vit d or exercising\par Under a lot of stress. Takes care of mother, having marriage issues. Has been getting cold sores and requests valtrex.\par Strong fam hx of breast cancer, desires genetic testing [Mammogramdate] : 2017 [PapSmeardate] : 2016 [ColonoscopyDate] : not yet [Previously active] : previously active

## 2021-02-13 NOTE — PHYSICAL EXAM
[Appropriately responsive] : appropriately responsive [Alert] : alert [No Acute Distress] : no acute distress [Soft] : soft [Non-tender] : non-tender [Non-distended] : non-distended [No HSM] : No HSM [No Lesions] : no lesions [Oriented x3] : oriented x3 [No Mass] : no mass [Examination Of The Breasts] : a normal appearance [No Masses] : no breast masses were palpable [Labia Majora] : normal [Labia Minora] : normal [Normal] : normal [Tenderness] : nontender [Enlarged ___ wks] : not enlarged [Mass ___ cm] : no uterine mass was palpated [Uterine Adnexae] : non-palpable [FreeTextEntry5] : pap done

## 2021-02-15 ENCOUNTER — TRANSCRIPTION ENCOUNTER (OUTPATIENT)
Age: 56
End: 2021-02-15

## 2021-02-19 LAB — HPV HIGH+LOW RISK DNA PNL CVX: NOT DETECTED

## 2021-03-03 DIAGNOSIS — R79.89 OTHER SPECIFIED ABNORMAL FINDINGS OF BLOOD CHEMISTRY: ICD-10-CM

## 2021-03-03 LAB
25(OH)D3 SERPL-MCNC: 19.1 NG/ML
ALBUMIN SERPL ELPH-MCNC: 4.6 G/DL
ALP BLD-CCNC: 84 U/L
ALT SERPL-CCNC: 25 U/L
ANION GAP SERPL CALC-SCNC: 12 MMOL/L
APPEARANCE: CLEAR
AST SERPL-CCNC: 29 U/L
BASOPHILS # BLD AUTO: 0.04 K/UL
BASOPHILS NFR BLD AUTO: 1 %
BILIRUB SERPL-MCNC: 0.2 MG/DL
BILIRUBIN URINE: NEGATIVE
BLOOD URINE: NEGATIVE
BUN SERPL-MCNC: 17 MG/DL
C3 SERPL-MCNC: 107 MG/DL
C4 SERPL-MCNC: 22 MG/DL
CALCIUM SERPL-MCNC: 9.6 MG/DL
CHLORIDE SERPL-SCNC: 100 MMOL/L
CO2 SERPL-SCNC: 26 MMOL/L
COLOR: NORMAL
CREAT SERPL-MCNC: 0.86 MG/DL
CRP SERPL-MCNC: <3 MG/L
EOSINOPHIL # BLD AUTO: 0.06 K/UL
EOSINOPHIL NFR BLD AUTO: 1.5 %
ERYTHROCYTE [SEDIMENTATION RATE] IN BLOOD BY WESTERGREN METHOD: 13 MM/HR
GLUCOSE QUALITATIVE U: NEGATIVE
GLUCOSE SERPL-MCNC: 96 MG/DL
HCT VFR BLD CALC: 41 %
HGB BLD-MCNC: 13.5 G/DL
IMM GRANULOCYTES NFR BLD AUTO: 0.2 %
KETONES URINE: NEGATIVE
LEUKOCYTE ESTERASE URINE: NEGATIVE
LYMPHOCYTES # BLD AUTO: 1.21 K/UL
LYMPHOCYTES NFR BLD AUTO: 29.5 %
MAN DIFF?: NORMAL
MCHC RBC-ENTMCNC: 32.2 PG
MCHC RBC-ENTMCNC: 32.9 GM/DL
MCV RBC AUTO: 97.9 FL
MONOCYTES # BLD AUTO: 0.34 K/UL
MONOCYTES NFR BLD AUTO: 8.3 %
NEUTROPHILS # BLD AUTO: 2.44 K/UL
NEUTROPHILS NFR BLD AUTO: 59.5 %
NITRITE URINE: NEGATIVE
PH URINE: 6.5
PLATELET # BLD AUTO: 328 K/UL
POTASSIUM SERPL-SCNC: 4.3 MMOL/L
PROT SERPL-MCNC: 7 G/DL
PROTEIN URINE: NORMAL
RBC # BLD: 4.19 M/UL
RBC # FLD: 12.6 %
SODIUM SERPL-SCNC: 139 MMOL/L
SPECIFIC GRAVITY URINE: 1.02
UROBILINOGEN URINE: NORMAL
WBC # FLD AUTO: 4.1 K/UL

## 2021-03-05 LAB
B BURGDOR AB SER-IMP: NEGATIVE
B BURGDOR IGM PATRN SER IB-IMP: NEGATIVE
B BURGDOR18KD IGG SER QL IB: PRESENT
B BURGDOR23KD IGG SER QL IB: NORMAL
B BURGDOR23KD IGM SER QL IB: NORMAL
B BURGDOR28KD IGG SER QL IB: NORMAL
B BURGDOR30KD IGG SER QL IB: NORMAL
B BURGDOR31KD IGG SER QL IB: NORMAL
B BURGDOR39KD IGG SER QL IB: NORMAL
B BURGDOR39KD IGM SER QL IB: NORMAL
B BURGDOR41KD IGG SER QL IB: PRESENT
B BURGDOR41KD IGM SER QL IB: PRESENT
B BURGDOR45KD IGG SER QL IB: NORMAL
B BURGDOR58KD IGG SER QL IB: NORMAL
B BURGDOR66KD IGG SER QL IB: NORMAL
B BURGDOR93KD IGG SER QL IB: PRESENT
DSDNA AB SER-ACNC: <12 IU/ML

## 2021-03-22 ENCOUNTER — APPOINTMENT (OUTPATIENT)
Dept: MAMMOGRAPHY | Facility: CLINIC | Age: 56
End: 2021-03-22
Payer: COMMERCIAL

## 2021-03-22 ENCOUNTER — RESULT REVIEW (OUTPATIENT)
Age: 56
End: 2021-03-22

## 2021-03-22 ENCOUNTER — OUTPATIENT (OUTPATIENT)
Dept: OUTPATIENT SERVICES | Facility: HOSPITAL | Age: 56
LOS: 1 days | End: 2021-03-22
Payer: COMMERCIAL

## 2021-03-22 DIAGNOSIS — Z12.31 ENCOUNTER FOR SCREENING MAMMOGRAM FOR MALIGNANT NEOPLASM OF BREAST: ICD-10-CM

## 2021-03-22 PROCEDURE — 77067 SCR MAMMO BI INCL CAD: CPT

## 2021-03-22 PROCEDURE — 77063 BREAST TOMOSYNTHESIS BI: CPT

## 2021-03-22 PROCEDURE — 77063 BREAST TOMOSYNTHESIS BI: CPT | Mod: 26

## 2021-03-22 PROCEDURE — 77067 SCR MAMMO BI INCL CAD: CPT | Mod: 26

## 2021-03-27 ENCOUNTER — RX RENEWAL (OUTPATIENT)
Age: 56
End: 2021-03-27

## 2021-03-29 ENCOUNTER — NON-APPOINTMENT (OUTPATIENT)
Age: 56
End: 2021-03-29

## 2021-03-29 DIAGNOSIS — N64.89 OTHER SPECIFIED DISORDERS OF BREAST: ICD-10-CM

## 2021-04-13 ENCOUNTER — APPOINTMENT (OUTPATIENT)
Dept: ULTRASOUND IMAGING | Facility: CLINIC | Age: 56
End: 2021-04-13
Payer: COMMERCIAL

## 2021-04-13 ENCOUNTER — APPOINTMENT (OUTPATIENT)
Dept: MAMMOGRAPHY | Facility: CLINIC | Age: 56
End: 2021-04-13
Payer: COMMERCIAL

## 2021-04-13 ENCOUNTER — OUTPATIENT (OUTPATIENT)
Dept: OUTPATIENT SERVICES | Facility: HOSPITAL | Age: 56
LOS: 1 days | End: 2021-04-13
Payer: COMMERCIAL

## 2021-04-13 ENCOUNTER — RESULT REVIEW (OUTPATIENT)
Age: 56
End: 2021-04-13

## 2021-04-13 DIAGNOSIS — Z00.8 ENCOUNTER FOR OTHER GENERAL EXAMINATION: ICD-10-CM

## 2021-04-13 DIAGNOSIS — N64.89 OTHER SPECIFIED DISORDERS OF BREAST: ICD-10-CM

## 2021-04-13 PROCEDURE — 77065 DX MAMMO INCL CAD UNI: CPT | Mod: 26,LT

## 2021-04-13 PROCEDURE — 76641 ULTRASOUND BREAST COMPLETE: CPT | Mod: 26,LT

## 2021-04-13 PROCEDURE — G0279: CPT | Mod: 26

## 2021-04-13 PROCEDURE — 77065 DX MAMMO INCL CAD UNI: CPT

## 2021-04-13 PROCEDURE — G0279: CPT

## 2021-04-13 PROCEDURE — 76641 ULTRASOUND BREAST COMPLETE: CPT

## 2021-04-15 ENCOUNTER — NON-APPOINTMENT (OUTPATIENT)
Age: 56
End: 2021-04-15

## 2021-04-15 ENCOUNTER — RESULT REVIEW (OUTPATIENT)
Age: 56
End: 2021-04-15

## 2021-04-21 ENCOUNTER — RX RENEWAL (OUTPATIENT)
Age: 56
End: 2021-04-21

## 2021-04-22 ENCOUNTER — NON-APPOINTMENT (OUTPATIENT)
Age: 56
End: 2021-04-22

## 2021-04-27 ENCOUNTER — APPOINTMENT (OUTPATIENT)
Dept: OBGYN | Facility: CLINIC | Age: 56
End: 2021-04-27
Payer: COMMERCIAL

## 2021-04-27 DIAGNOSIS — Z91.89 OTHER SPECIFIED PERSONAL RISK FACTORS, NOT ELSEWHERE CLASSIFIED: ICD-10-CM

## 2021-04-27 PROCEDURE — 99213 OFFICE O/P EST LOW 20 MIN: CPT | Mod: 95

## 2021-04-27 NOTE — HISTORY OF PRESENT ILLNESS
[Home] : at home, [unfilled] , at the time of the visit. [Medical Office: ([facility])___] : at the medical office located in  [Verbal consent obtained from patient] : the patient, [unfilled] [FreeTextEntry1] : Telehealth to discuss Myrisk results. Pt tested negative for genetic mutations. 1 variant of uncertain significance.\par RjeiJerod breast cancer risk calculation 26%.\par She recently had screening ammo/sono and needs 6 month fu bilateral dx mammo.

## 2021-04-27 NOTE — DISCUSSION/SUMMARY
[FreeTextEntry1] : VUS reviewed with patient and explained this will be reclassified as benign or a mutation eventually, does not alter management at this time. Modified medical management reviewed with pt secondary to lifetime risk of breast cancer being 26%\par Breast self awareness, CBE 6-12 months, yearly mammo with tomosynthesis, yearly breast MRI staggered 6 months after mammo, risk reducing medicines.\par Pt will consider breast MRI in 6 months.

## 2021-05-20 ENCOUNTER — RX RENEWAL (OUTPATIENT)
Age: 56
End: 2021-05-20

## 2021-05-23 PROBLEM — D22.9 MULTIPLE BENIGN NEVI: Status: ACTIVE | Noted: 2021-05-23

## 2021-05-23 PROBLEM — L81.4 LENTIGINES: Status: ACTIVE | Noted: 2021-05-23

## 2021-05-23 PROBLEM — Z12.83 SCREENING FOR SKIN CANCER: Status: RESOLVED | Noted: 2021-05-23 | Resolved: 2021-06-02

## 2021-05-24 ENCOUNTER — NON-APPOINTMENT (OUTPATIENT)
Age: 56
End: 2021-05-24

## 2021-05-24 ENCOUNTER — APPOINTMENT (OUTPATIENT)
Dept: DERMATOLOGY | Facility: CLINIC | Age: 56
End: 2021-05-24
Payer: COMMERCIAL

## 2021-05-24 DIAGNOSIS — L81.4 OTHER MELANIN HYPERPIGMENTATION: ICD-10-CM

## 2021-05-24 DIAGNOSIS — L40.0 PSORIASIS VULGARIS: ICD-10-CM

## 2021-05-24 DIAGNOSIS — Z12.83 ENCOUNTER FOR SCREENING FOR MALIGNANT NEOPLASM OF SKIN: ICD-10-CM

## 2021-05-24 DIAGNOSIS — D22.9 MELANOCYTIC NEVI, UNSPECIFIED: ICD-10-CM

## 2021-05-24 PROCEDURE — 99204 OFFICE O/P NEW MOD 45 MIN: CPT

## 2021-05-24 RX ORDER — BETAMETHASONE DIPROPIONATE 0.5 MG/G
0.05 OINTMENT TOPICAL TWICE DAILY
Qty: 1 | Refills: 3 | Status: ACTIVE | COMMUNITY
Start: 2021-05-24 | End: 1900-01-01

## 2021-05-24 RX ORDER — CLOBETASOL PROPIONATE 0.5 MG/ML
0.05 SOLUTION TOPICAL
Qty: 2 | Refills: 6 | Status: ACTIVE | COMMUNITY
Start: 2021-05-24 | End: 1900-01-01

## 2021-05-24 NOTE — HISTORY OF PRESENT ILLNESS
[FreeTextEntry1] : psoriasis, skin check [de-identified] : 55 year old female here for psoriasis and skin check. also on plaquenil for SLE.

## 2021-05-24 NOTE — ASSESSMENT
[FreeTextEntry1] : 1) benign findings as above- education\par \par 2) psoriasis\par education\par clobetasol solution PRN on scalp; SED\par betamethasone ointment BID PRN on hands; SED\par \par discussed incisional bx of growth on left breast; patient defers\par discussed IPL in fall at [facility]

## 2021-05-24 NOTE — PHYSICAL EXAM
[FreeTextEntry3] : AAOx3, pleasant, NAD, no visual lymphadenopathy\par hair, scalp, face, nose, eyelids, ears, lips, oropharynx, neck, chest, abdomen, back, right arm, left arm, nails, and hands examined with all normal findings,\par pertinent findings include:\par \par multiple benign nevi and lentigines\par psoriatic plaques on hands and scalp

## 2021-06-28 ENCOUNTER — RX RENEWAL (OUTPATIENT)
Age: 56
End: 2021-06-28

## 2021-08-20 ENCOUNTER — RESULT REVIEW (OUTPATIENT)
Age: 56
End: 2021-08-20

## 2021-08-20 ENCOUNTER — APPOINTMENT (OUTPATIENT)
Dept: RHEUMATOLOGY | Facility: CLINIC | Age: 56
End: 2021-08-20
Payer: COMMERCIAL

## 2021-08-20 VITALS
HEART RATE: 78 BPM | SYSTOLIC BLOOD PRESSURE: 118 MMHG | OXYGEN SATURATION: 98 % | WEIGHT: 123 LBS | DIASTOLIC BLOOD PRESSURE: 68 MMHG | TEMPERATURE: 97 F | BODY MASS INDEX: 22.5 KG/M2

## 2021-08-20 DIAGNOSIS — F51.04 PSYCHOPHYSIOLOGIC INSOMNIA: ICD-10-CM

## 2021-08-20 DIAGNOSIS — F32.9 MAJOR DEPRESSIVE DISORDER, SINGLE EPISODE, UNSPECIFIED: ICD-10-CM

## 2021-08-20 DIAGNOSIS — G47.00 INSOMNIA, UNSPECIFIED: ICD-10-CM

## 2021-08-20 PROCEDURE — 99214 OFFICE O/P EST MOD 30 MIN: CPT

## 2021-08-20 RX ORDER — LORAZEPAM 0.5 MG/1
0.5 TABLET ORAL
Qty: 10 | Refills: 0 | Status: ACTIVE | COMMUNITY
Start: 2020-09-09 | End: 1900-01-01

## 2021-08-20 RX ORDER — CHOLECALCIFEROL (VITAMIN D3) 1250 MCG
1.25 MG CAPSULE ORAL
Qty: 12 | Refills: 0 | Status: DISCONTINUED | COMMUNITY
Start: 2021-05-20 | End: 2021-08-20

## 2021-08-20 RX ORDER — ZOLPIDEM TARTRATE 5 MG/1
5 TABLET ORAL
Qty: 60 | Refills: 2 | Status: DISCONTINUED | COMMUNITY
Start: 2020-07-02 | End: 2021-08-20

## 2021-08-20 RX ORDER — VALACYCLOVIR 500 MG/1
500 TABLET, FILM COATED ORAL TWICE DAILY
Qty: 6 | Refills: 5 | Status: DISCONTINUED | COMMUNITY
Start: 2021-02-13 | End: 2021-08-20

## 2021-08-21 PROBLEM — G47.00 INSOMNIA: Status: ACTIVE | Noted: 2020-12-17

## 2021-08-21 PROBLEM — F32.9 CHRONIC MAJOR DEPRESSIVE DISORDER: Status: ACTIVE | Noted: 2020-07-02

## 2021-08-21 PROBLEM — F51.04 CHRONIC INSOMNIA: Status: ACTIVE | Noted: 2020-07-02

## 2021-08-21 NOTE — PHYSICAL EXAM
[Oriented To Time, Place, And Person] : oriented to person, place, and time [Impaired Insight] : insight and judgment were intact [Affect] : the affect was normal [General Appearance - Alert] : alert [General Appearance - Well Nourished] : well nourished [General Appearance - Well Developed] : well developed [Sclera] : the sclera and conjunctiva were normal [Extraocular Movements] : extraocular movements were intact [PERRL With Normal Accommodation] : pupils were equal in size, round, and reactive to light [Outer Ear] : the ears and nose were normal in appearance [Examination Of The Oral Cavity] : the lips and gums were normal [Nasal Cavity] : the nasal mucosa and septum were normal [Oropharynx] : the oropharynx was normal [Neck Appearance] : the appearance of the neck was normal [Neck Cervical Mass (___cm)] : no neck mass was observed [Jugular Venous Distention Increased] : there was no jugular-venous distention [Thyroid Diffuse Enlargement] : the thyroid was not enlarged [Thyroid Nodule] : there were no palpable thyroid nodules [] : no respiratory distress [Auscultation Breath Sounds / Voice Sounds] : lungs were clear to auscultation bilaterally [Heart Rate And Rhythm] : heart rate was normal and rhythm regular [Heart Sounds] : normal S1 and S2 [Heart Sounds Gallop] : no gallops [Murmurs] : no murmurs [Heart Sounds Pericardial Friction Rub] : no pericardial rub [Edema] : there was no peripheral edema [Cervical Lymph Nodes Enlarged Posterior Bilaterally] : posterior cervical [Cervical Lymph Nodes Enlarged Anterior Bilaterally] : anterior cervical [Supraclavicular Lymph Nodes Enlarged Bilaterally] : supraclavicular [Sensation] : the sensory exam was normal to light touch and pinprick [No Focal Deficits] : no focal deficits [FreeTextEntry1] : depression controlled- though admits to ongoing stressors , no SI - much improved w/ resolution of severe symmetrical synovitis- last visit

## 2021-08-21 NOTE — HISTORY OF PRESENT ILLNESS
[FreeTextEntry1] : 20 \par - progressive increase in psoriasis noted over past several months, better when in sun...and presents today nearly fully resolved... \par - psoriasis was confirmed... \par - continues w/ HCq .. though likely psoriasis triggered by this.. still feels joints much improved overall.. except diffuse back pain throughout.. \par - recent severe poison ivy... steroids.. immediately effective  and felt great..  \par - lower back/ neck pain continues to be significant issue.. taking tizanidine at night with excellent response, sleeping much better.. doesn't wake feeling groggy... still w/ chronic cervicalgia/ and lower back myofascial pain \par - recent attempt at PT w/ Jean James NOT helpful.. couldn't participate fully 2/2 pain.. \par \par 1) UCTD  \par 2) MDD and anxiety reactive w/ chronic caregiver stress \par 3) HLD- mild \par 4) Hyperglycemia:  HbA1c 5.5 2017\par 5) COnfirmed psoriasis \par __________________________________________________________________________\par (Initial HPI 20) \par 53 yo  hx asthma, and intermittent "inflammatory polyarthralgias"...(since childhood) repeated evaluations in past inconsistent w/ SLE possible .. \par Excellent response to steroids in past.. \par This episode started more than 2 ys ago but no insurance and didn't do anything \par "Pain Everywhere" progressively worse, entire spine most severely now all joints painful w/ intermittent "burning sensation hands over MCPs" ROM limited in migratory pattern, currently L shoulder and hips.. hips- groin and lateral hip pain.. not severe swelling\par Hands feel like "sausages"\par Rash- dry scaly skin on hands dorsal surface- bx unremarkable, occas scaly lesions on scalp\par One thickened toe nail.. otherwise nl.. \par Associated \par ROS:  denies fevers, chills, night sweats, lymphadenopathy, arthropathy, rash, alopecia, raynauds, headaches, vision loss, sicca, mucositis, serositis, cp-mild intermittent but nothing recent, noted EKG changes asymptomatic- recommend cardiac eval but never done 2/2 insurance issues, sob, cough, GERD, n/v/d/c or abd bloating, \par HYperglycemia/ HLD \par \par Sleep: trouble falling/ staying asleep.. occas marijuana, ambien, Mg..  but nothing consistent... \par \par Pregnancy:  1 elective Ab.. hyperemesis, depression.. No hx bleeding/ clotting dyscrasias or cytopenias, paresthesias or weakness, renal or hepatic dysfunction. \par \par \par \par Long hx depression (bipolar):  well controlled since early childhood Prozac w/ + response but generic never worked.. \par NOTE: \par - attempted suicide as 18 now well controlled-\par Tx\par - Zoloft 100 mg \par - Long hx depression on lexapro 20 mg / venlaxifine 37.5 \par - amitriptyline\par - lithium\par \par \par + FH mother w/ PsA vs. RA on Xeljanz \par \par

## 2021-08-21 NOTE — ASSESSMENT
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 \par \par 1) Psoriasis w/ PsA : confirmed psoriasis...   Initial presentation intermittent asymmetrical pauciarthropathy in migratory pattern for many years.. L shoulder and back - throughout and flexor tendon nodules ttt over R/L 2nd digit - no triggering/ locking ... fully controlled at this point.\par Severe diffuse myofascial pain throughout entire spine with am stiffness "all day"... will get imaging now to r/o axial disease / SI arthropathy.  If present will change tx now.... \par Started on HCQ for possible CTD ... and psoriasis increased dramatically...  but all joint sx fully controlled. \par Rash- initially scalp only, over past yr noted dry scaly lesions on arms/ legs.. fully resolved w/ topical agents and sun exposure.  \par NOTE: \par +  w/ strong FH mother RA/ or PsA (now reports psoriasis)\par \par Discussion:  could change tx now but feeling well except for back issues. If evidence of axial disease will change tx now, if not.. will continue to tx these sx as myofascial given + response to tizandine and ongoing stress.. but will need to work on stretching program.. recommend restorative yoga... \par Await updated imaging now.. \par \par 2)  UCTD::  + JEZ 1:640H w/ neg subserologies to include ACE... RF/ CCP repeatedly neg\par Repeated evaluations in past inconsistent w/ SLE or some type of CTD..HCq immediately effective at low dose..now on BRAND (STEFANI)..  \par NOTE: \par Generic HCQ severe nausea/ dizziness, now on brand (STEFANI)\par NOTE:\par 7/6/20 full rheum eval neg otherwise\par - Episodes can last weeks- months and totally nl in between.\par - Repeatedly excellent response to steroids in past.. \par \par 3) HLD- mild \par 4) Hyperglycemia:  HbA1c 5.5 2017\par 5) MDD (possible bipolar II) w/ sleep disorder- Insomnia and chronic anxiety- stress:  controlled but not as well as would like on current tx, Lexapro now at 30 mg and off venlaxifine 37.5 mg... 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 sleep improved greatly with tizanidine, no longer using ambien..  Trial PT not tolerated, didn't think it was helpful, massage to neck twice wkly + response .\par Anxiety: at times severe r/t family issues. Very rare use of lorazepam... still, recommend relaxation training.. Calm mayra suggested\par NOTE: \par - attempted suicide as 19 yo now well controlled- working w/  individual therapist now... \par Tx\par - excellent control on Prozac for many ys.. but only brand, can't afford now\par - Zoloft 100 mg \par - Long hx depression on lexapro 20 mg / venlaxifine 37.5 \par - amitriptyline\par - lithium\par \par Plan: \par - Need to confirm eye exam.. baseline screening \par \par - decrease to 200 mg HCQ  daily (brand),\par \par - Might consider Otezla, TNFi, or Xeljanz (mother doing very well on this)\par \par - will write for lexapro for now but needs to find therapist, if not will be concerned about continuing to provide.  Will find someone in her area \par \par - f/u in 3 mnths  \par \par \par

## 2021-08-21 NOTE — REVIEW OF SYSTEMS
[Dry Eyes] : dryness of the eyes [As Noted in HPI] : as noted in HPI [Skin Lesions] : skin lesion [Negative] : Heme/Lymph [Feeling Poorly] : not feeling poorly [Feeling Tired] : not feeling tired [Recent Weight Gain (___ Lbs)] : no recent weight gain [Arthralgias] : no arthralgias [Joint Pain] : no joint pain [FreeTextEntry2] : stable wt - overall feeling much better  [Joint Stiffness] : no joint stiffness [FreeTextEntry3] : mild - and no sx of inflammatory eye disease  [FreeTextEntry4] : no mucositis [FreeTextEntry9] : Full resolution of all  stiffness and joint limited L shoulder ROM w/ POM and stiffness throughout spine- neck/ lower back  [de-identified] : very faint- not appreciated today..

## 2021-09-02 ENCOUNTER — TRANSCRIPTION ENCOUNTER (OUTPATIENT)
Age: 56
End: 2021-09-02

## 2021-09-09 ENCOUNTER — APPOINTMENT (OUTPATIENT)
Dept: RADIOLOGY | Facility: CLINIC | Age: 56
End: 2021-09-09
Payer: COMMERCIAL

## 2021-09-09 ENCOUNTER — OUTPATIENT (OUTPATIENT)
Dept: OUTPATIENT SERVICES | Facility: HOSPITAL | Age: 56
LOS: 1 days | End: 2021-09-09
Payer: COMMERCIAL

## 2021-09-09 DIAGNOSIS — L40.50 ARTHROPATHIC PSORIASIS, UNSPECIFIED: ICD-10-CM

## 2021-09-09 PROCEDURE — 72100 X-RAY EXAM L-S SPINE 2/3 VWS: CPT

## 2021-09-09 PROCEDURE — 72050 X-RAY EXAM NECK SPINE 4/5VWS: CPT

## 2021-09-09 PROCEDURE — 72100 X-RAY EXAM L-S SPINE 2/3 VWS: CPT | Mod: 26

## 2021-09-09 PROCEDURE — 72070 X-RAY EXAM THORAC SPINE 2VWS: CPT

## 2021-09-09 PROCEDURE — 72202 X-RAY EXAM SI JOINTS 3/> VWS: CPT

## 2021-09-09 PROCEDURE — 72202 X-RAY EXAM SI JOINTS 3/> VWS: CPT | Mod: 26

## 2021-09-09 PROCEDURE — 72070 X-RAY EXAM THORAC SPINE 2VWS: CPT | Mod: 26

## 2021-09-09 PROCEDURE — 72050 X-RAY EXAM NECK SPINE 4/5VWS: CPT | Mod: 26

## 2021-09-12 ENCOUNTER — TRANSCRIPTION ENCOUNTER (OUTPATIENT)
Age: 56
End: 2021-09-12

## 2021-09-14 ENCOUNTER — TRANSCRIPTION ENCOUNTER (OUTPATIENT)
Age: 56
End: 2021-09-14

## 2021-09-16 ENCOUNTER — TRANSCRIPTION ENCOUNTER (OUTPATIENT)
Age: 56
End: 2021-09-16

## 2021-09-22 ENCOUNTER — TRANSCRIPTION ENCOUNTER (OUTPATIENT)
Age: 56
End: 2021-09-22

## 2021-09-28 ENCOUNTER — TRANSCRIPTION ENCOUNTER (OUTPATIENT)
Age: 56
End: 2021-09-28

## 2021-09-30 ENCOUNTER — LABORATORY RESULT (OUTPATIENT)
Age: 56
End: 2021-09-30

## 2021-10-04 ENCOUNTER — TRANSCRIPTION ENCOUNTER (OUTPATIENT)
Age: 56
End: 2021-10-04

## 2021-10-06 ENCOUNTER — LABORATORY RESULT (OUTPATIENT)
Age: 56
End: 2021-10-06

## 2021-10-06 ENCOUNTER — TRANSCRIPTION ENCOUNTER (OUTPATIENT)
Age: 56
End: 2021-10-06

## 2021-10-08 ENCOUNTER — TRANSCRIPTION ENCOUNTER (OUTPATIENT)
Age: 56
End: 2021-10-08

## 2021-10-12 LAB
25(OH)D3 SERPL-MCNC: 66.9 NG/ML
ALBUMIN SERPL ELPH-MCNC: 4.5 G/DL
ALP BLD-CCNC: 95 U/L
ALT SERPL-CCNC: 36 U/L
ANION GAP SERPL CALC-SCNC: 11 MMOL/L
APPEARANCE: CLEAR
AST SERPL-CCNC: 34 U/L
BASOPHILS # BLD AUTO: 0.05 K/UL
BASOPHILS NFR BLD AUTO: 1.2 %
BILIRUB SERPL-MCNC: 0.3 MG/DL
BILIRUBIN URINE: NEGATIVE
BLOOD URINE: NEGATIVE
BUN SERPL-MCNC: 17 MG/DL
C3 SERPL-MCNC: 117 MG/DL
C4 SERPL-MCNC: 23 MG/DL
CALCIUM SERPL-MCNC: 9.9 MG/DL
CHLORIDE SERPL-SCNC: 102 MMOL/L
CO2 SERPL-SCNC: 28 MMOL/L
COLOR: NORMAL
COVID-19 SPIKE DOMAIN ANTIBODY INTERPRETATION: POSITIVE
CREAT SERPL-MCNC: 0.81 MG/DL
CRP SERPL-MCNC: <3 MG/L
DSDNA AB SER-ACNC: <12 IU/ML
EOSINOPHIL # BLD AUTO: 0.06 K/UL
EOSINOPHIL NFR BLD AUTO: 1.4 %
ERYTHROCYTE [SEDIMENTATION RATE] IN BLOOD BY WESTERGREN METHOD: 24 MM/HR
GLUCOSE QUALITATIVE U: NEGATIVE
GLUCOSE SERPL-MCNC: 94 MG/DL
HCT VFR BLD CALC: 41 %
HGB BLD-MCNC: 13.8 G/DL
IMM GRANULOCYTES NFR BLD AUTO: 0.2 %
KETONES URINE: NEGATIVE
LEUKOCYTE ESTERASE URINE: ABNORMAL
LYMPHOCYTES # BLD AUTO: 1.6 K/UL
LYMPHOCYTES NFR BLD AUTO: 37.5 %
MAN DIFF?: NORMAL
MCHC RBC-ENTMCNC: 32.3 PG
MCHC RBC-ENTMCNC: 33.7 GM/DL
MCV RBC AUTO: 96 FL
MONOCYTES # BLD AUTO: 0.28 K/UL
MONOCYTES NFR BLD AUTO: 6.6 %
NEUTROPHILS # BLD AUTO: 2.27 K/UL
NEUTROPHILS NFR BLD AUTO: 53.1 %
NITRITE URINE: NEGATIVE
PH URINE: 7
PLATELET # BLD AUTO: 385 K/UL
POTASSIUM SERPL-SCNC: 4.5 MMOL/L
PROT SERPL-MCNC: 7.2 G/DL
PROTEIN URINE: NEGATIVE
RBC # BLD: 4.27 M/UL
RBC # FLD: 12.2 %
SARS-COV-2 AB SERPL IA-ACNC: >250 U/ML
SODIUM SERPL-SCNC: 141 MMOL/L
SPECIFIC GRAVITY URINE: 1.02
UROBILINOGEN URINE: NORMAL
WBC # FLD AUTO: 4.27 K/UL

## 2021-10-13 ENCOUNTER — TRANSCRIPTION ENCOUNTER (OUTPATIENT)
Age: 56
End: 2021-10-13

## 2021-10-19 ENCOUNTER — NON-APPOINTMENT (OUTPATIENT)
Age: 56
End: 2021-10-19

## 2021-10-21 ENCOUNTER — APPOINTMENT (OUTPATIENT)
Dept: RHEUMATOLOGY | Facility: CLINIC | Age: 56
End: 2021-10-21
Payer: COMMERCIAL

## 2021-10-21 VITALS
OXYGEN SATURATION: 97 % | BODY MASS INDEX: 22.68 KG/M2 | DIASTOLIC BLOOD PRESSURE: 68 MMHG | HEART RATE: 67 BPM | SYSTOLIC BLOOD PRESSURE: 118 MMHG | TEMPERATURE: 97.6 F | WEIGHT: 124 LBS

## 2021-10-21 DIAGNOSIS — M35.9 SYSTEMIC INVOLVEMENT OF CONNECTIVE TISSUE, UNSPECIFIED: ICD-10-CM

## 2021-10-21 DIAGNOSIS — L40.50 ARTHROPATHIC PSORIASIS, UNSPECIFIED: ICD-10-CM

## 2021-10-21 DIAGNOSIS — F32.A DEPRESSION, UNSPECIFIED: ICD-10-CM

## 2021-10-21 DIAGNOSIS — M79.18 MYALGIA, OTHER SITE: ICD-10-CM

## 2021-10-21 PROCEDURE — 90686 IIV4 VACC NO PRSV 0.5 ML IM: CPT

## 2021-10-21 PROCEDURE — G0008: CPT

## 2021-10-21 PROCEDURE — 99214 OFFICE O/P EST MOD 30 MIN: CPT | Mod: 25

## 2021-10-22 PROBLEM — F32.A DEPRESSION: Status: ACTIVE | Noted: 2021-02-13

## 2021-10-22 PROBLEM — L40.50 PSORIATIC ARTHRITIS: Status: ACTIVE | Noted: 2021-08-20

## 2021-10-22 PROBLEM — M35.9 CONNECTIVE TISSUE DISEASE, UNDIFFERENTIATED: Status: ACTIVE | Noted: 2020-07-30

## 2021-10-22 PROBLEM — M79.18 MYOFASCIAL MUSCLE PAIN: Status: ACTIVE | Noted: 2020-07-30

## 2021-11-05 ENCOUNTER — TRANSCRIPTION ENCOUNTER (OUTPATIENT)
Age: 56
End: 2021-11-05

## 2021-11-05 NOTE — ASSESSMENT
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 \par \par 1) Psoriasis w/ PsA : confirmed psoriasis...   Initial presentation intermittent asymmetrical pauciarthropathy in migratory pattern for many years.. L shoulder and back - throughout and flexor tendon nodules ttt over R/L 2nd digit - no triggering/ locking ... fully controlled at this point on  mg- decreased to 200 mg concerned that HCQ actually made Psoriasis worse, but convinced overall feeling worse on lower dose and will resume 400 mg now.. does not want to consider other medications \par Severe diffuse myofascial pain throughout entire spine with am stiffness "all day"... imaging was NEG for inflammatory arthritis 9/21 \par NOTE: \par Started on HCQ for possible CTD ... and psoriasis increased dramatically...  but all joint sx fully controlled. \par Rash- initially scalp only, over past yr noted dry scaly lesions on arms/ legs.. fully resolved w/ topical agents and sun exposure.  \par +  w/ strong FH mother RA/ or PsA (now reports psoriasis)\par \par Discussion:  no evidence of axial disease.. wants to resume 400 mg (actually w/ wt 56 kg actually on 7mg/kg) and needs to lower dose... will need to add another agent.. eye exam needed q 6 m..\par \par 2)  UCTD::  + JEZ 1:640H w/ neg subserologies to include ACE... RF/ CCP repeatedly neg 3/21 \par Repeated evaluations in past inconsistent w/ SLE or some type of CTD..HCq immediately effective at low dose..now on BRAND (STEFANI)..  \par NOTE: \par Generic HCQ severe nausea/ dizziness, now on brand (STEFANI)\par NOTE:\par - Episodes can last weeks- months and totally nl in between.\par - Repeatedly excellent response to steroids in past.. \par \par 3) HLD- mild \par 4) Hyperglycemia:  HbA1c 5.5 2017\par \par 5) MDD (possible bipolar II) w/ sleep disorder- Insomnia and chronic anxiety- stress:  controlled but not as well as would like on current tx, Lexapro now at 30 mg and off venlaxifine 37.5 mg... 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 sleep improved greatly with tizanidine, no longer using ambien..  Trial PT not tolerated, didn't think it was helpful, massage to neck twice wkly + response .\par Anxiety: at times severe r/t family issues. Very rare use of lorazepam... still, recommend relaxation training.. Calm mayra suggested\par NOTE: \par - attempted suicide as 19 yo now well controlled- working w/  individual therapist now... \par Tx\par - excellent control on Prozac for many ys.. but only brand, can't afford now\par - Zoloft 100 mg \par - Long hx depression on lexapro 20 mg / venlaxifine 37.5 \par - amitriptyline\par - lithium\par \par Plan: \par - Need to confirm eye exam.. baseline screening still need this.. \par \par - needs lower dose HCQ.. insisted on resumption of 400 mg.. will reassess at next visit. If much better will then switch to MTX and/ or other DMARds depending on presentation.. cannot consider continuing at 400 mg (7 mg/kg) \par \par - may need to consider MTX.. \par \par - Might consider Otezla, TNFi, or Xeljanz (mother doing very well on this)\par \par - will write for lexapro for now but needs to find therapist, if not will be concerned about continuing to provide.  Will find someone in her area \par \par - try medical massage now \par \par - continue tizanidine 8 mg .. at HS \par \par - take Prednisone 5 mg tabs:  take 8 tabs -4-5-6-4-3-2-1 .. tell me if this helped... again if helpful needs DMARD \par \par - f/u in 3-4 mnths  \par \par \par

## 2021-11-05 NOTE — REVIEW OF SYSTEMS
[Dry Eyes] : dryness of the eyes [As Noted in HPI] : as noted in HPI [Skin Lesions] : skin lesion [Negative] : Heme/Lymph [Feeling Poorly] : not feeling poorly [Feeling Tired] : not feeling tired [Recent Weight Gain (___ Lbs)] : no recent weight gain [Arthralgias] : no arthralgias [Joint Pain] : no joint pain [Joint Stiffness] : no joint stiffness [FreeTextEntry3] : mild - and no sx of inflammatory eye disease  [FreeTextEntry2] : stable wt - widespread pain worse  [FreeTextEntry4] : no mucositis [FreeTextEntry9] : Full resolution of all  stiffness and joint limited L shoulder ROM w/ POM and stiffness throughout spine- neck/ lower back  [de-identified] : very faint- not appreciated today..

## 2021-11-05 NOTE — HISTORY OF PRESENT ILLNESS
[FreeTextEntry1] : 10/21/21 \par - miserable on lower dose of HCQ.. would like to resume 400 mg.. \par - Has felt poorly since 2nd COVID vaccine with increase in joint pain diffusely throughout.\par - still with intermittent severe pain hypersensitive to touch.. diffusely\par - diffuse spinal stiffness/ pain.. imaging essentially neg  w/ no evidence of inflammatory axial disease or sacroilitis \par - psoriasis much better today.. improved over summer with sun exposure.. \par - attempt at PT w/ Jean James NOT helpful.. couldn't participate fully 2/2 pain.. \par \par 1) UCTD  \par 2) MDD and anxiety reactive w/ chronic caregiver stress \par 3) HLD- mild \par 4) Hyperglycemia:  HbA1c 5.5 2017\par 5) COnfirmed psoriasis \par __________________________________________________________________________\par (Initial HPI 20) \par 55 yo  hx asthma, and intermittent "inflammatory polyarthralgias"...(since childhood) repeated evaluations in past inconsistent w/ SLE possible .. \par Excellent response to steroids in past.. \par This episode started more than 2 ys ago but no insurance and didn't do anything \par "Pain Everywhere" progressively worse, entire spine most severely now all joints painful w/ intermittent "burning sensation hands over MCPs" ROM limited in migratory pattern, currently L shoulder and hips.. hips- groin and lateral hip pain.. not severe swelling\par Hands feel like "sausages"\par Rash- dry scaly skin on hands dorsal surface- bx unremarkable, occas scaly lesions on scalp\par One thickened toe nail.. otherwise nl.. \par Associated \par ROS:  denies fevers, chills, night sweats, lymphadenopathy, arthropathy, rash, alopecia, raynauds, headaches, vision loss, sicca, mucositis, serositis, cp-mild intermittent but nothing recent, noted EKG changes asymptomatic- recommend cardiac eval but never done 2/2 insurance issues, sob, cough, GERD, n/v/d/c or abd bloating, \par HYperglycemia/ HLD \par \par Sleep: trouble falling/ staying asleep.. occas marijuana, ambien, Mg..  but nothing consistent... \par \par Pregnancy:  1 elective Ab.. hyperemesis, depression.. No hx bleeding/ clotting dyscrasias or cytopenias, paresthesias or weakness, renal or hepatic dysfunction. \par \par \par \par Long hx depression (bipolar):  well controlled since early childhood Prozac w/ + response but generic never worked.. \par NOTE: \par - attempted suicide as 18 now well controlled-\par Tx\par - Zoloft 100 mg \par - Long hx depression on lexapro 20 mg / venlaxifine 37.5 \par - amitriptyline\par - lithium\par \par \par + FH mother w/ PsA vs. RA on Xeljanz \par \par

## 2021-11-05 NOTE — PROCEDURE
[Risks] : risks [Benefits] : benefits [Alternatives] : alternatives [Consent Obtained] : written consent was obtained prior to the procedure and is detailed in the patient's record [Patient] : Prior to the start of the procedure a time out was taken and the identity of the patient was confirmed via name and date of birth with the patient. The correct site and the procedure to be performed were confirmed. The correct side was confirmed if applicable. The availability of the correct equipment was verified [Therapeutic] : therapeutic [#1 Site: ______] : #1 site identified in the [unfilled] [25 gauge 5/8  inch] : A 25 gauge 5/8 inch needle was used [Tolerated Well] : the patient tolerated the procedure well [No Complications] : there were no complications [Instructions Given] : handouts/patient instructions were given to patient [Patient Instructed to Call] : patient was instructed to call if redness at site, a decrease in range of motion or an increase in pain is noted after procedure. [Other Date:___] : Date: [unfilled] [de-identified] : fluarix  [FreeTextEntry1] : monitor for flulike sx or allergic rxn, take APAP as needed for first 3 days, if flulike sx persist call office\par \par \par

## 2021-11-05 NOTE — PHYSICAL EXAM
[General Appearance - Alert] : alert [General Appearance - Well Nourished] : well nourished [General Appearance - Well Developed] : well developed [Sclera] : the sclera and conjunctiva were normal [PERRL With Normal Accommodation] : pupils were equal in size, round, and reactive to light [Extraocular Movements] : extraocular movements were intact [Neck Appearance] : the appearance of the neck was normal [Neck Cervical Mass (___cm)] : no neck mass was observed [Jugular Venous Distention Increased] : there was no jugular-venous distention [Thyroid Diffuse Enlargement] : the thyroid was not enlarged [Thyroid Nodule] : there were no palpable thyroid nodules [] : no respiratory distress [Auscultation Breath Sounds / Voice Sounds] : lungs were clear to auscultation bilaterally [Heart Rate And Rhythm] : heart rate was normal and rhythm regular [Heart Sounds] : normal S1 and S2 [Heart Sounds Gallop] : no gallops [Murmurs] : no murmurs [Heart Sounds Pericardial Friction Rub] : no pericardial rub [Edema] : there was no peripheral edema [Cervical Lymph Nodes Enlarged Posterior Bilaterally] : posterior cervical [Cervical Lymph Nodes Enlarged Anterior Bilaterally] : anterior cervical [Supraclavicular Lymph Nodes Enlarged Bilaterally] : supraclavicular [Sensation] : the sensory exam was normal to light touch and pinprick [No Focal Deficits] : no focal deficits [Oriented To Time, Place, And Person] : oriented to person, place, and time [Impaired Insight] : insight and judgment were intact [Affect] : the affect was normal [Respiration, Rhythm And Depth] : normal respiratory rhythm and effort [FreeTextEntry1] : depression controlled- though admits to ongoing stressors , no SI - much improved w/ resolution of severe symmetrical synovitis- last visit

## 2021-11-13 ENCOUNTER — OUTPATIENT (OUTPATIENT)
Dept: OUTPATIENT SERVICES | Facility: HOSPITAL | Age: 56
LOS: 1 days | End: 2021-11-13

## 2021-11-13 ENCOUNTER — APPOINTMENT (OUTPATIENT)
Dept: MAMMOGRAPHY | Facility: CLINIC | Age: 56
End: 2021-11-13
Payer: COMMERCIAL

## 2021-11-13 ENCOUNTER — OUTPATIENT (OUTPATIENT)
Dept: OUTPATIENT SERVICES | Facility: HOSPITAL | Age: 56
LOS: 1 days | End: 2021-11-13
Payer: COMMERCIAL

## 2021-11-13 ENCOUNTER — RESULT REVIEW (OUTPATIENT)
Age: 56
End: 2021-11-13

## 2021-11-13 DIAGNOSIS — N64.89 OTHER SPECIFIED DISORDERS OF BREAST: ICD-10-CM

## 2021-11-13 PROCEDURE — 77065 DX MAMMO INCL CAD UNI: CPT | Mod: 26,LT

## 2021-11-13 PROCEDURE — 77065 DX MAMMO INCL CAD UNI: CPT

## 2021-11-13 PROCEDURE — G0279: CPT | Mod: 26

## 2021-11-13 PROCEDURE — G0279: CPT

## 2021-11-15 ENCOUNTER — TRANSCRIPTION ENCOUNTER (OUTPATIENT)
Age: 56
End: 2021-11-15

## 2021-11-30 ENCOUNTER — APPOINTMENT (OUTPATIENT)
Dept: OBGYN | Facility: CLINIC | Age: 56
End: 2021-11-30
Payer: COMMERCIAL

## 2021-11-30 VITALS
HEIGHT: 62 IN | WEIGHT: 124 LBS | SYSTOLIC BLOOD PRESSURE: 128 MMHG | BODY MASS INDEX: 22.82 KG/M2 | DIASTOLIC BLOOD PRESSURE: 76 MMHG

## 2021-11-30 DIAGNOSIS — R10.2 PELVIC AND PERINEAL PAIN: ICD-10-CM

## 2021-11-30 LAB
BILIRUB UR QL STRIP: NORMAL
CLARITY UR: CLEAR
COLLECTION METHOD: NORMAL
GLUCOSE UR-MCNC: NORMAL
HCG UR QL: 0.2 EU/DL
HGB UR QL STRIP.AUTO: NORMAL
KETONES UR-MCNC: NORMAL
LEUKOCYTE ESTERASE UR QL STRIP: NORMAL
NITRITE UR QL STRIP: NORMAL
PH UR STRIP: 5.5
PROT UR STRIP-MCNC: NORMAL
SP GR UR STRIP: 1.03

## 2021-11-30 PROCEDURE — 99213 OFFICE O/P EST LOW 20 MIN: CPT

## 2021-11-30 NOTE — PHYSICAL EXAM
[Appropriately responsive] : appropriately responsive [Alert] : alert [No Acute Distress] : no acute distress [Labia Majora] : normal [Labia Minora] : normal [Normal] : normal [Tenderness] : nontender [Enlarged ___ wks] : not enlarged [Mass ___ cm] : no uterine mass was palpated [Uterine Adnexae] : non-palpable [FreeTextEntry4] : small spec used due to pt intolerance, normal dc

## 2021-11-30 NOTE — HISTORY OF PRESENT ILLNESS
[FreeTextEntry1] : Pt states she feels menstrual cramps even though hasn’t had menses. Min dc, no odor. No itching, not SA x 5 years. No urinary symptoms.\par

## 2022-04-08 ENCOUNTER — TRANSCRIPTION ENCOUNTER (OUTPATIENT)
Age: 57
End: 2022-04-08

## 2022-04-28 ENCOUNTER — APPOINTMENT (OUTPATIENT)
Dept: RHEUMATOLOGY | Facility: CLINIC | Age: 57
End: 2022-04-28

## 2022-04-28 ENCOUNTER — NON-APPOINTMENT (OUTPATIENT)
Age: 57
End: 2022-04-28

## 2022-05-07 ENCOUNTER — NON-APPOINTMENT (OUTPATIENT)
Age: 57
End: 2022-05-07

## 2022-05-20 ENCOUNTER — APPOINTMENT (OUTPATIENT)
Dept: RHEUMATOLOGY | Facility: CLINIC | Age: 57
End: 2022-05-20

## 2022-07-22 ENCOUNTER — TRANSCRIPTION ENCOUNTER (OUTPATIENT)
Age: 57
End: 2022-07-22

## 2022-09-01 ENCOUNTER — APPOINTMENT (OUTPATIENT)
Dept: OBGYN | Facility: CLINIC | Age: 57
End: 2022-09-01

## 2022-09-01 VITALS
SYSTOLIC BLOOD PRESSURE: 122 MMHG | BODY MASS INDEX: 22.86 KG/M2 | TEMPERATURE: 98 F | WEIGHT: 125 LBS | DIASTOLIC BLOOD PRESSURE: 70 MMHG

## 2022-09-01 DIAGNOSIS — Z12.31 ENCOUNTER FOR SCREENING MAMMOGRAM FOR MALIGNANT NEOPLASM OF BREAST: ICD-10-CM

## 2022-09-01 PROCEDURE — 82270 OCCULT BLOOD FECES: CPT

## 2022-09-01 PROCEDURE — 99396 PREV VISIT EST AGE 40-64: CPT

## 2022-09-01 RX ORDER — HYDROXYCHLOROQUINE SULFATE 200 MG/1
200 TABLET ORAL
Qty: 90 | Refills: 1 | Status: DISCONTINUED | COMMUNITY
Start: 2020-07-30 | End: 2022-09-01

## 2022-09-01 RX ORDER — ESCITALOPRAM OXALATE 10 MG/1
10 TABLET ORAL
Qty: 90 | Refills: 3 | Status: DISCONTINUED | COMMUNITY
Start: 2020-09-09 | End: 2022-09-01

## 2022-09-01 RX ORDER — ESCITALOPRAM OXALATE 20 MG/1
20 TABLET ORAL
Qty: 90 | Refills: 3 | Status: DISCONTINUED | COMMUNITY
End: 2022-09-01

## 2022-09-01 NOTE — HISTORY OF PRESENT ILLNESS
[Previously active] : previously active [TextBox_4] : last exam 2016\par LMP 2.5 yrs, no vb since, still gets cramps\par Not taking vit d or exercising\par Mother passed 7 weeks ago .\par Strong fam hx of breast cancer, genetic testing negative, lifetime risk >20% [Mammogramdate] : 2021 [PapSmeardate] : 2/2021 [ColonoscopyDate] : not yet, going to do cologard

## 2023-04-05 NOTE — ED ADULT NURSE NOTE - NSSUHOSCREENINGYN_ED_ALL_ED
Class IV (difficult) - the soft palate is not visible at all
Yes - the patient is able to be screened

## 2023-05-11 ENCOUNTER — APPOINTMENT (OUTPATIENT)
Dept: RHEUMATOLOGY | Facility: CLINIC | Age: 58
End: 2023-05-11
Payer: COMMERCIAL

## 2023-05-11 ENCOUNTER — LABORATORY RESULT (OUTPATIENT)
Age: 58
End: 2023-05-11

## 2023-05-11 VITALS
OXYGEN SATURATION: 99 % | HEART RATE: 56 BPM | BODY MASS INDEX: 23 KG/M2 | HEIGHT: 62 IN | SYSTOLIC BLOOD PRESSURE: 112 MMHG | TEMPERATURE: 98.2 F | DIASTOLIC BLOOD PRESSURE: 74 MMHG | WEIGHT: 125 LBS

## 2023-05-11 DIAGNOSIS — M06.4 INFLAMMATORY POLYARTHROPATHY: ICD-10-CM

## 2023-05-11 DIAGNOSIS — M79.642 PAIN IN RIGHT HAND: ICD-10-CM

## 2023-05-11 DIAGNOSIS — M79.641 PAIN IN RIGHT HAND: ICD-10-CM

## 2023-05-11 DIAGNOSIS — L40.9 PSORIASIS, UNSPECIFIED: ICD-10-CM

## 2023-05-11 DIAGNOSIS — R76.8 OTHER SPECIFIED ABNORMAL IMMUNOLOGICAL FINDINGS IN SERUM: ICD-10-CM

## 2023-05-11 PROCEDURE — 99214 OFFICE O/P EST MOD 30 MIN: CPT

## 2023-05-12 ENCOUNTER — TRANSCRIPTION ENCOUNTER (OUTPATIENT)
Age: 58
End: 2023-05-12

## 2023-05-12 ENCOUNTER — RESULT REVIEW (OUTPATIENT)
Age: 58
End: 2023-05-12

## 2023-05-12 DIAGNOSIS — R92.2 INCONCLUSIVE MAMMOGRAM: ICD-10-CM

## 2023-05-15 LAB
ALBUMIN SERPL ELPH-MCNC: 5.1 G/DL
ALP BLD-CCNC: 97 U/L
ALT SERPL-CCNC: 41 U/L
ANA PAT FLD IF-IMP: ABNORMAL
ANA SER IF-ACNC: ABNORMAL
ANION GAP SERPL CALC-SCNC: 13 MMOL/L
APPEARANCE: CLEAR
AST SERPL-CCNC: 33 U/L
BASOPHILS # BLD AUTO: 0.04 K/UL
BASOPHILS NFR BLD AUTO: 1 %
BILIRUB SERPL-MCNC: 0.5 MG/DL
BILIRUBIN URINE: NEGATIVE
BLOOD URINE: NEGATIVE
BUN SERPL-MCNC: 20 MG/DL
C3 SERPL-MCNC: 130 MG/DL
C4 SERPL-MCNC: 28 MG/DL
CALCIUM SERPL-MCNC: 10.4 MG/DL
CCP AB SER IA-ACNC: <8 UNITS
CHLORIDE SERPL-SCNC: 102 MMOL/L
CO2 SERPL-SCNC: 27 MMOL/L
COLOR: YELLOW
CREAT SERPL-MCNC: 0.82 MG/DL
CREAT SPEC-SCNC: 147 MG/DL
CREAT/PROT UR: 0.1 RATIO
CRP SERPL-MCNC: <3 MG/L
DSDNA AB SER-ACNC: <12 IU/ML
EGFR: 83 ML/MIN/1.73M2
ENA RNP AB SER IA-ACNC: 0.5 AL
ENA SM AB SER IA-ACNC: <0.2 AL
ENA SS-A AB SER IA-ACNC: <0.2 AL
ENA SS-B AB SER IA-ACNC: <0.2 AL
EOSINOPHIL # BLD AUTO: 0.06 K/UL
EOSINOPHIL NFR BLD AUTO: 1.4 %
ERYTHROCYTE [SEDIMENTATION RATE] IN BLOOD BY WESTERGREN METHOD: 30 MM/HR
GLUCOSE QUALITATIVE U: NEGATIVE MG/DL
GLUCOSE SERPL-MCNC: 95 MG/DL
HAV IGM SER QL: NONREACTIVE
HBV CORE IGG+IGM SER QL: NONREACTIVE
HBV CORE IGM SER QL: NONREACTIVE
HBV SURFACE AG SER QL: NONREACTIVE
HCT VFR BLD CALC: 43.5 %
HCV AB SER QL: NONREACTIVE
HCV S/CO RATIO: 0.23 S/CO
HGB BLD-MCNC: 14.3 G/DL
IMM GRANULOCYTES NFR BLD AUTO: 0.2 %
KETONES URINE: NEGATIVE MG/DL
LEUKOCYTE ESTERASE URINE: ABNORMAL
LYMPHOCYTES # BLD AUTO: 1.17 K/UL
LYMPHOCYTES NFR BLD AUTO: 28.1 %
M TB IFN-G BLD-IMP: NEGATIVE
MAN DIFF?: NORMAL
MCHC RBC-ENTMCNC: 31.3 PG
MCHC RBC-ENTMCNC: 32.9 GM/DL
MCV RBC AUTO: 95.2 FL
MONOCYTES # BLD AUTO: 0.37 K/UL
MONOCYTES NFR BLD AUTO: 8.9 %
NEUTROPHILS # BLD AUTO: 2.51 K/UL
NEUTROPHILS NFR BLD AUTO: 60.4 %
NITRITE URINE: NEGATIVE
PH URINE: 5.5
PLATELET # BLD AUTO: 356 K/UL
POTASSIUM SERPL-SCNC: 4.4 MMOL/L
PROT SERPL-MCNC: 7.6 G/DL
PROT UR-MCNC: 10 MG/DL
PROTEIN URINE: NEGATIVE MG/DL
QUANTIFERON TB PLUS MITOGEN MINUS NIL: 0.69 IU/ML
QUANTIFERON TB PLUS NIL: 0.01 IU/ML
QUANTIFERON TB PLUS TB1 MINUS NIL: 0 IU/ML
QUANTIFERON TB PLUS TB2 MINUS NIL: 0 IU/ML
RBC # BLD: 4.57 M/UL
RBC # FLD: 12.2 %
RF+CCP IGG SER-IMP: NEGATIVE
RHEUMATOID FACT SER QL: <10 IU/ML
SODIUM SERPL-SCNC: 142 MMOL/L
SPECIFIC GRAVITY URINE: 1.02
UROBILINOGEN URINE: 0.2 MG/DL
WBC # FLD AUTO: 4.16 K/UL

## 2023-05-15 RX ORDER — TIZANIDINE 4 MG/1
4 TABLET ORAL
Qty: 120 | Refills: 1 | Status: ACTIVE | COMMUNITY
Start: 2020-12-17 | End: 1900-01-01

## 2023-05-16 ENCOUNTER — LABORATORY RESULT (OUTPATIENT)
Age: 58
End: 2023-05-16

## 2023-05-16 PROBLEM — L40.9 PSORIASIS: Status: ACTIVE | Noted: 2023-05-16

## 2023-05-16 PROBLEM — R76.8 POSITIVE ANA (ANTINUCLEAR ANTIBODY): Status: ACTIVE | Noted: 2020-07-30

## 2023-05-16 PROBLEM — M06.4 INFLAMMATORY POLYARTHROPATHY: Status: ACTIVE | Noted: 2020-09-09

## 2023-05-16 NOTE — ASSESSMENT
[FreeTextEntry1] : 57F with psoriasis, bipolar/depression, and history of inflammatory polyarthropathy, previously a patient of rodney Howell DNP but lost to followup since 10/2021, here for evaluation of ongoing polyarticular joint pains. States her psoriasis is not active at this time. Expresses concern over her L 5th PIP joint fixed in flexion after a bee sting- not sure if it is from her autoimmune condition.\par Previously treated for PsA and ?UCTD however pt only with +JEZ, no other serologies positive in past. No response to HCQ in past, now off this medication.\par \par At this time due to pt not being seen in 1.5 years will repeat all serologies including ESR, CRP, RA labs, tests for SLE, Sjogrens as well. \par Will check hepatitis and quantiferon TB testing in anticipation for possible future biologic use if necessary.\par Will also check US of b/l hands/wrists to evaluate for synovitis/tenosynovitis. \par \par Follow up in 2 months.

## 2023-05-16 NOTE — HISTORY OF PRESENT ILLNESS
[___ Month(s) Ago] : [unfilled] month(s) ago [FreeTextEntry1] : 5/2023 followup; last seen 1.5 years ago.\par Patient states psoriasis not active at this point.\par works as  but difficulty working at this time, difficulty moving finger while playing piano. L 5th digit fixed in flexion about a year ago after a bee sting. \par gets canker sores in mouth for a long time. No nasal ulcers. \par No eye pain or redness. Blurry vision recently- has not seen eye doctor since going off plaquenil. \par No hematuria. Dry mouth, no dry eyes. \par No hx of DVT/PE/miscarriages. \par \par shoulders, hands, hips, feet, neck hurt bilaterally.\par Left hand is worse than R. hand. \par Feels that joints are always swollen. \par Does feel that digits appear like sausages in AM. \par Gets morning stiffness in joints that could last for hours.

## 2023-05-16 NOTE — PHYSICAL EXAM
[General Appearance - Alert] : alert [General Appearance - In No Acute Distress] : in no acute distress [General Appearance - Well Developed] : well developed [General Appearance - Well-Appearing] : healthy appearing [Sclera] : the sclera and conjunctiva were normal [Extraocular Movements] : extraocular movements were intact [Exaggerated Use Of Accessory Muscles For Inspiration] : no accessory muscle use [Edema] : there was no peripheral edema [FreeTextEntry1] : tenderness in b/l shoulders and b/l wrists, no synovitis or dactylitis noted [Skin Color & Pigmentation] : normal skin color and pigmentation [] : no rash [Skin Lesions] : no skin lesions [No Focal Deficits] : no focal deficits [Oriented To Time, Place, And Person] : oriented to person, place, and time [Affect] : the affect was normal [Mood] : the mood was normal

## 2023-05-16 NOTE — REVIEW OF SYSTEMS
[Feeling Poorly] : feeling poorly [Eye Pain] : no eye pain [Red Eyes] : eyes not red [Dry Eyes] : no dryness of the eyes [Arthralgias] : arthralgias [Joint Pain] : joint pain [Joint Swelling] : joint swelling [Joint Stiffness] : joint stiffness [As Noted in HPI] : as noted in HPI [Depression] : depression [Negative] : Heme/Lymph [FreeTextEntry4] : dry mouth  [de-identified] : psoriasis not active at this time

## 2023-05-22 ENCOUNTER — RESULT REVIEW (OUTPATIENT)
Age: 58
End: 2023-05-22

## 2023-05-22 ENCOUNTER — OUTPATIENT (OUTPATIENT)
Dept: OUTPATIENT SERVICES | Facility: HOSPITAL | Age: 58
LOS: 1 days | End: 2023-05-22
Payer: COMMERCIAL

## 2023-05-22 ENCOUNTER — APPOINTMENT (OUTPATIENT)
Dept: ULTRASOUND IMAGING | Facility: CLINIC | Age: 58
End: 2023-05-22
Payer: COMMERCIAL

## 2023-05-22 DIAGNOSIS — M79.641 PAIN IN RIGHT HAND: ICD-10-CM

## 2023-05-22 PROCEDURE — 76882 US LMTD JT/FCL EVL NVASC XTR: CPT

## 2023-05-22 PROCEDURE — 76882 US LMTD JT/FCL EVL NVASC XTR: CPT | Mod: 26,RT

## 2023-05-25 ENCOUNTER — NON-APPOINTMENT (OUTPATIENT)
Age: 58
End: 2023-05-25

## 2023-05-25 LAB
AA PROT SER-MCNC: 2.2 UG/ML
ANTI-BETA2 GLYCOPROTEIN 1 IGG CONCENTRATION: 2 U/ML
ANTI-BETA2 GLYCOPROTEIN 1 IGM CONCENTRATION: <2.4 U/ML
ANTI-CARDIOLIPIN IGG CONCENTRATION: 1 GPL
ANTI-CARDIOLIPIN IGM CONCENTRATION: <0.9 MPL
ANTI-CENP IGG CONCENTRATION: <0.4 U/ML
ANTI-CYCLIC CITRULLINATED PEPTIDE IGG CONCENTRATION: 2 U/ML
ANTI-DOUBLE-STRANDED DNA IGG CONCENTRATION: 21 IU/ML
ANTI-JO-1 IGG CONCENTRATION: <0.3 U/ML
ANTI-NUCLEAR ANTIBODIES - CYTOPLASMIC PATTERN: NORMAL
ANTI-NUCLEAR ANTIBODIES - PRIMARY NUCLEAR PATTERN: NORMAL
ANTI-NUCLEAR ANTIBODIES - PRIMARY PATTERN TITER: ABNORMAL
ANTI-NUCLEAR ANTIBODIES IGG CONCENTRATION: 19 UNITS
ANTI-RNA POL III IGG CONCENTRATION: <0.7 U/ML
ANTI-RNP70 IGG CONCENTRATION: 5 U/ML
ANTI-RO52 IGG CONCENTRATION: 1 U/ML
ANTI-RO60 IGG CONCENTRATION: <0.4 U/ML
ANTI-SCL-70 IGG CONCENTRATION: 1 U/ML
ANTI-SMITH IGG CONCENTRATION: <0.7 U/ML
ANTI-SS-B (LA) IGG CONCENTRATION: 0 U/ML
ANTI-THYROGLOBULIN IGG CONCENTRATION: <12 IU/ML
ANTI-THYROID PEROXIDASE IGG CONCENTRATION: <4 IU/ML
ANTI-U1RNP IGG CONCENTRATION: 3 U/ML
APPEARANCE: CLEAR
AVISE LUPUS INDEX: -1
AVISE LUPUS RESULT: NEGATIVE
B-LYMPHOCYTE-BOUND C4D (BC4D) LEVEL: 36
BILIRUBIN URINE: NEGATIVE
BIOMARKER COMMENT: NORMAL
BLOOD URINE: NEGATIVE
COLOR: YELLOW
CRP SERPL-MCNC: 1.1 MG/L
EGF SERPL-MCNC: 80 PG/ML
ERYTHROCYTE-BOUND C4D (EC4D) LEVEL: 4
FOOTNOTE: NORMAL
GLUCOSE QUALITATIVE U: NEGATIVE MG/DL
IL6 SERPL-MCNC: 3.3 PG/ML
KETONES URINE: ABNORMAL MG/DL
LEPTIN SERPL-MCNC: 24 NG/ML
LEUKOCYTE ESTERASE URINE: ABNORMAL
Lab: NORMAL
Lab: NORMAL
MMP-1 SERPL-MCNC: 7.5 NG/ML
MMP-3 SERPL-MCNC: 20 NG/ML
NITRITE URINE: NEGATIVE
PH URINE: 6
PROTEIN URINE: NORMAL MG/DL
RA DAS LEVEL QL VECTRADA: NORMAL
RESISTIN SERPL-MCNC: 3 NG/ML
RHEUMATOID FACTOR (IGA) CONCENTRATION: 4 IU/ML
RHEUMATOID FACTOR (IGM) CONCENTRATION: 1 IU/ML
RISK OF RADIOGRAPHIC PROGRESS: 3 %
SPECIFIC GRAVITY URINE: 1.02
TNFRSF1A SERPL-MCNC: 0.58 NG/ML
UROBILINOGEN URINE: 1 MG/DL
VAP-1 SERPL-MCNC: 0.53 UG/ML
VECTRA SCORE: 28
VEGF-A SERPL-MCNC: 150 PG/ML
YKL-40 RESULT: 37 NG/ML

## 2023-06-01 ENCOUNTER — NON-APPOINTMENT (OUTPATIENT)
Age: 58
End: 2023-06-01

## 2023-06-14 ENCOUNTER — RESULT REVIEW (OUTPATIENT)
Age: 58
End: 2023-06-14

## 2023-06-14 ENCOUNTER — APPOINTMENT (OUTPATIENT)
Dept: ULTRASOUND IMAGING | Facility: CLINIC | Age: 58
End: 2023-06-14
Payer: COMMERCIAL

## 2023-06-14 ENCOUNTER — OUTPATIENT (OUTPATIENT)
Dept: OUTPATIENT SERVICES | Facility: HOSPITAL | Age: 58
LOS: 1 days | End: 2023-06-14
Payer: COMMERCIAL

## 2023-06-14 ENCOUNTER — APPOINTMENT (OUTPATIENT)
Dept: MAMMOGRAPHY | Facility: CLINIC | Age: 58
End: 2023-06-14
Payer: COMMERCIAL

## 2023-06-14 DIAGNOSIS — Z12.31 ENCOUNTER FOR SCREENING MAMMOGRAM FOR MALIGNANT NEOPLASM OF BREAST: ICD-10-CM

## 2023-06-14 DIAGNOSIS — R92.2 INCONCLUSIVE MAMMOGRAM: ICD-10-CM

## 2023-06-14 DIAGNOSIS — Z00.8 ENCOUNTER FOR OTHER GENERAL EXAMINATION: ICD-10-CM

## 2023-06-14 PROCEDURE — 76700 US EXAM ABDOM COMPLETE: CPT | Mod: 26

## 2023-06-14 PROCEDURE — 77067 SCR MAMMO BI INCL CAD: CPT

## 2023-06-14 PROCEDURE — 76641 ULTRASOUND BREAST COMPLETE: CPT

## 2023-06-14 PROCEDURE — 76641 ULTRASOUND BREAST COMPLETE: CPT | Mod: 26,50

## 2023-06-14 PROCEDURE — 77066 DX MAMMO INCL CAD BI: CPT | Mod: 26

## 2023-06-14 PROCEDURE — 77066 DX MAMMO INCL CAD BI: CPT

## 2023-06-14 PROCEDURE — G0279: CPT

## 2023-06-14 PROCEDURE — 76700 US EXAM ABDOM COMPLETE: CPT

## 2023-06-14 PROCEDURE — G0279: CPT | Mod: 26

## 2023-06-14 PROCEDURE — 77063 BREAST TOMOSYNTHESIS BI: CPT

## 2023-07-11 ENCOUNTER — APPOINTMENT (OUTPATIENT)
Dept: RHEUMATOLOGY | Facility: CLINIC | Age: 58
End: 2023-07-11

## 2023-09-07 ENCOUNTER — APPOINTMENT (OUTPATIENT)
Dept: OBGYN | Facility: CLINIC | Age: 58
End: 2023-09-07
Payer: COMMERCIAL

## 2023-09-07 VITALS — SYSTOLIC BLOOD PRESSURE: 118 MMHG | WEIGHT: 126 LBS | BODY MASS INDEX: 23.05 KG/M2 | DIASTOLIC BLOOD PRESSURE: 70 MMHG

## 2023-09-07 DIAGNOSIS — Z01.419 ENCOUNTER FOR GYNECOLOGICAL EXAMINATION (GENERAL) (ROUTINE) W/OUT ABNORMAL FINDINGS: ICD-10-CM

## 2023-09-07 LAB
CARD LOT #: NORMAL
CARD LOT EXP DATE: NORMAL
DATE COLLECTED: NORMAL
DATE COLLECTED: NORMAL
DEVELOPER LOT #: NORMAL
DEVELOPER LOT EXP DATE: NORMAL
HEMOCCULT 2: NEGATIVE
HEMOCCULT SP1 STL QL: NEGATIVE
QUALITY CONTROL: YES
QUALITY CONTROL: YES

## 2023-09-07 PROCEDURE — 99396 PREV VISIT EST AGE 40-64: CPT

## 2023-09-07 NOTE — PHYSICAL EXAM
[Appropriately responsive] : appropriately responsive [Alert] : alert [No Acute Distress] : no acute distress [Soft] : soft [Non-tender] : non-tender [Examination Of The Breasts] : a normal appearance [No Masses] : no breast masses were palpable [Labia Majora] : normal [Labia Minora] : normal [Normal] : normal [Uterine Adnexae] : non-palpable [No Tenderness] : no tenderness [Tenderness] : nontender [Enlarged ___ wks] : not enlarged [Mass ___ cm] : no uterine mass was palpated [FreeTextEntry4] : small spec used due to pt intolerance,  [FreeTextEntry5] : pap done [FreeTextEntry9] : guaiac-

## 2023-09-07 NOTE — HISTORY OF PRESENT ILLNESS
[Previously active] : previously active [TextBox_4] : LMP 3.5 yrs, no vb since, still gets cramps Not taking vit d states level normal, exercising, has arthritis  Strong fam hx of breast cancer, genetic testing negative, lifetime risk >20% [Mammogramdate] : 6/2023 [PapSmeardate] : 2/2021 [ColonoscopyDate] : not yet, going to do cologard [TextBox_43] : did Cologuard

## 2023-09-10 LAB — HPV HIGH+LOW RISK DNA PNL CVX: NOT DETECTED

## 2023-09-13 LAB — CYTOLOGY CVX/VAG DOC THIN PREP: ABNORMAL

## 2024-09-09 ENCOUNTER — APPOINTMENT (OUTPATIENT)
Dept: OBGYN | Facility: CLINIC | Age: 59
End: 2024-09-09
Payer: COMMERCIAL

## 2024-09-09 VITALS
SYSTOLIC BLOOD PRESSURE: 120 MMHG | BODY MASS INDEX: 23.19 KG/M2 | WEIGHT: 126 LBS | DIASTOLIC BLOOD PRESSURE: 70 MMHG | HEIGHT: 62 IN

## 2024-09-09 DIAGNOSIS — Z78.0 ASYMPTOMATIC MENOPAUSAL STATE: ICD-10-CM

## 2024-09-09 DIAGNOSIS — Z01.419 ENCOUNTER FOR GYNECOLOGICAL EXAMINATION (GENERAL) (ROUTINE) W/OUT ABNORMAL FINDINGS: ICD-10-CM

## 2024-09-09 PROCEDURE — 99396 PREV VISIT EST AGE 40-64: CPT

## 2024-09-09 PROCEDURE — 82270 OCCULT BLOOD FECES: CPT

## 2024-09-09 RX ORDER — ESCITALOPRAM OXALATE 20 MG/1
20 TABLET, FILM COATED ORAL
Refills: 0 | Status: ACTIVE | COMMUNITY

## 2024-09-09 NOTE — HISTORY OF PRESENT ILLNESS
[Previously active] : previously active [TextBox_4] : LMP 4.5 yrs, no vb since, still gets cramps Not taking vit d in summer, exercising, has arthritis  Strong fam hx of breast cancer, genetic testing negative, lifetime risk >20% [Mammogramdate] : 6/2023 [PapSmeardate] : 2023 [ColonoscopyDate] : not yet, going to do cologard [TextBox_43] : did Cologuard

## 2024-09-09 NOTE — PHYSICAL EXAM
[Appropriately responsive] : appropriately responsive [Alert] : alert [No Acute Distress] : no acute distress [Soft] : soft [Non-tender] : non-tender [Examination Of The Breasts] : a normal appearance [No Masses] : no breast masses were palpable [Labia Majora] : normal [Labia Minora] : normal [Normal] : normal [Uterine Adnexae] : non-palpable [No Tenderness] : no tenderness [Tenderness] : nontender [Enlarged ___ wks] : not enlarged [Mass ___ cm] : no uterine mass was palpated [FreeTextEntry4] : small spec used due to pt intolerance [FreeTextEntry5] : pap not done [FreeTextEntry9] : guaiac-